# Patient Record
Sex: MALE | Race: BLACK OR AFRICAN AMERICAN | Employment: UNEMPLOYED | URBAN - METROPOLITAN AREA
[De-identification: names, ages, dates, MRNs, and addresses within clinical notes are randomized per-mention and may not be internally consistent; named-entity substitution may affect disease eponyms.]

---

## 2021-06-29 ENCOUNTER — HOSPITAL ENCOUNTER (OUTPATIENT)
Age: 71
Setting detail: OUTPATIENT SURGERY
Discharge: HOME | End: 2021-06-29
Attending: SPECIALIST | Admitting: SPECIALIST
Payer: MEDICARE

## 2021-06-29 VITALS
OXYGEN SATURATION: 99 % | HEIGHT: 67 IN | DIASTOLIC BLOOD PRESSURE: 43 MMHG | RESPIRATION RATE: 17 BRPM | SYSTOLIC BLOOD PRESSURE: 139 MMHG | BODY MASS INDEX: 26.19 KG/M2 | HEART RATE: 48 BPM | TEMPERATURE: 98.5 F | WEIGHT: 166.9 LBS

## 2021-06-29 DIAGNOSIS — R94.39 ABNORMAL STRESS TEST: ICD-10-CM

## 2021-06-29 LAB
ACT BLD: 180 SECS (ref 79–138)
ACT BLD: 202 SECS (ref 79–138)
ANION GAP SERPL CALC-SCNC: 8 MMOL/L (ref 5–15)
BUN SERPL-MCNC: 20 MG/DL (ref 6–20)
BUN/CREAT SERPL: 20 (ref 12–20)
CALCIUM SERPL-MCNC: 9.1 MG/DL (ref 8.5–10.1)
CHLORIDE SERPL-SCNC: 102 MMOL/L (ref 97–108)
CO2 SERPL-SCNC: 22 MMOL/L (ref 21–32)
CREAT SERPL-MCNC: 1.01 MG/DL (ref 0.7–1.3)
GLUCOSE SERPL-MCNC: 120 MG/DL (ref 65–100)
POTASSIUM SERPL-SCNC: 3.9 MMOL/L (ref 3.5–5.1)
SODIUM SERPL-SCNC: 132 MMOL/L (ref 136–145)

## 2021-06-29 PROCEDURE — 93571 IV DOP VEL&/PRESS C FLO 1ST: CPT | Performed by: SPECIALIST

## 2021-06-29 PROCEDURE — C1887 CATHETER, GUIDING: HCPCS | Performed by: SPECIALIST

## 2021-06-29 PROCEDURE — C1894 INTRO/SHEATH, NON-LASER: HCPCS | Performed by: SPECIALIST

## 2021-06-29 PROCEDURE — 77030004532 HC CATH ANGI DX IMP BSC -A: Performed by: SPECIALIST

## 2021-06-29 PROCEDURE — 77030029065 HC DRSG HEMO QCLOT ZMED -B

## 2021-06-29 PROCEDURE — 74011250636 HC RX REV CODE- 250/636: Performed by: SPECIALIST

## 2021-06-29 PROCEDURE — 77030013715 HC INFL SYS MRTM -B: Performed by: SPECIALIST

## 2021-06-29 PROCEDURE — 99152 MOD SED SAME PHYS/QHP 5/>YRS: CPT | Performed by: SPECIALIST

## 2021-06-29 PROCEDURE — 74011000636 HC RX REV CODE- 636: Performed by: SPECIALIST

## 2021-06-29 PROCEDURE — C1874 STENT, COATED/COV W/DEL SYS: HCPCS | Performed by: SPECIALIST

## 2021-06-29 PROCEDURE — 36415 COLL VENOUS BLD VENIPUNCTURE: CPT

## 2021-06-29 PROCEDURE — 77030008543 HC TBNG MON PRSS MRTM -A: Performed by: SPECIALIST

## 2021-06-29 PROCEDURE — 77030003390 HC NDL ANGI MRTM -A: Performed by: SPECIALIST

## 2021-06-29 PROCEDURE — 74011000250 HC RX REV CODE- 250: Performed by: SPECIALIST

## 2021-06-29 PROCEDURE — 99153 MOD SED SAME PHYS/QHP EA: CPT | Performed by: SPECIALIST

## 2021-06-29 PROCEDURE — 80048 BASIC METABOLIC PNL TOTAL CA: CPT

## 2021-06-29 PROCEDURE — 93005 ELECTROCARDIOGRAM TRACING: CPT

## 2021-06-29 PROCEDURE — 85347 COAGULATION TIME ACTIVATED: CPT

## 2021-06-29 PROCEDURE — 92928 PRQ TCAT PLMT NTRAC ST 1 LES: CPT | Performed by: SPECIALIST

## 2021-06-29 PROCEDURE — 93458 L HRT ARTERY/VENTRICLE ANGIO: CPT | Performed by: SPECIALIST

## 2021-06-29 PROCEDURE — 77030013797 HC KT TRNSDUC PRSSR EDWD -A: Performed by: SPECIALIST

## 2021-06-29 PROCEDURE — 74011250637 HC RX REV CODE- 250/637: Performed by: SPECIALIST

## 2021-06-29 PROCEDURE — C1769 GUIDE WIRE: HCPCS | Performed by: SPECIALIST

## 2021-06-29 DEVICE — STENT RONYX27512UX RESOLUTE ONYX 2.75X12
Type: IMPLANTABLE DEVICE | Status: FUNCTIONAL
Brand: RESOLUTE ONYX™

## 2021-06-29 RX ORDER — FENOFIBRATE 160 MG/1
160 TABLET ORAL DAILY
COMMUNITY

## 2021-06-29 RX ORDER — SODIUM CHLORIDE 0.9 % (FLUSH) 0.9 %
5-40 SYRINGE (ML) INJECTION AS NEEDED
Status: DISCONTINUED | OUTPATIENT
Start: 2021-06-29 | End: 2021-06-29 | Stop reason: HOSPADM

## 2021-06-29 RX ORDER — CLOPIDOGREL BISULFATE 75 MG/1
75 TABLET ORAL DAILY
Qty: 30 TABLET | Refills: 5 | Status: SHIPPED | OUTPATIENT
Start: 2021-06-29

## 2021-06-29 RX ORDER — SODIUM CHLORIDE 9 MG/ML
75 INJECTION, SOLUTION INTRAVENOUS CONTINUOUS
Status: DISCONTINUED | OUTPATIENT
Start: 2021-06-29 | End: 2021-06-29

## 2021-06-29 RX ORDER — ASPIRIN 81 MG/1
81 TABLET ORAL DAILY
COMMUNITY

## 2021-06-29 RX ORDER — SODIUM CHLORIDE 9 MG/ML
125 INJECTION, SOLUTION INTRAVENOUS CONTINUOUS
Status: DISCONTINUED | OUTPATIENT
Start: 2021-06-29 | End: 2021-06-29 | Stop reason: HOSPADM

## 2021-06-29 RX ORDER — FENTANYL CITRATE 50 UG/ML
INJECTION, SOLUTION INTRAMUSCULAR; INTRAVENOUS AS NEEDED
Status: DISCONTINUED | OUTPATIENT
Start: 2021-06-29 | End: 2021-06-29 | Stop reason: HOSPADM

## 2021-06-29 RX ORDER — ATORVASTATIN CALCIUM 40 MG/1
40 TABLET, FILM COATED ORAL
COMMUNITY

## 2021-06-29 RX ORDER — SODIUM CHLORIDE 0.9 % (FLUSH) 0.9 %
5-40 SYRINGE (ML) INJECTION EVERY 8 HOURS
Status: DISCONTINUED | OUTPATIENT
Start: 2021-06-29 | End: 2021-06-29 | Stop reason: HOSPADM

## 2021-06-29 RX ORDER — METFORMIN HYDROCHLORIDE 500 MG/1
500 TABLET ORAL 2 TIMES DAILY WITH MEALS
COMMUNITY

## 2021-06-29 RX ORDER — HYDRALAZINE HYDROCHLORIDE 50 MG/1
50 TABLET, FILM COATED ORAL 3 TIMES DAILY
COMMUNITY

## 2021-06-29 RX ORDER — HYDROCORTISONE SODIUM SUCCINATE 100 MG/2ML
100 INJECTION, POWDER, FOR SOLUTION INTRAMUSCULAR; INTRAVENOUS
Status: DISCONTINUED | OUTPATIENT
Start: 2021-06-29 | End: 2021-06-29 | Stop reason: HOSPADM

## 2021-06-29 RX ORDER — ASPIRIN 325 MG
325 TABLET ORAL DAILY
Status: DISCONTINUED | OUTPATIENT
Start: 2021-06-30 | End: 2021-06-29 | Stop reason: HOSPADM

## 2021-06-29 RX ORDER — MIDAZOLAM HYDROCHLORIDE 1 MG/ML
INJECTION, SOLUTION INTRAMUSCULAR; INTRAVENOUS AS NEEDED
Status: DISCONTINUED | OUTPATIENT
Start: 2021-06-29 | End: 2021-06-29 | Stop reason: HOSPADM

## 2021-06-29 RX ORDER — HEPARIN SODIUM 1000 [USP'U]/ML
INJECTION, SOLUTION INTRAVENOUS; SUBCUTANEOUS AS NEEDED
Status: DISCONTINUED | OUTPATIENT
Start: 2021-06-29 | End: 2021-06-29 | Stop reason: HOSPADM

## 2021-06-29 RX ORDER — POTASSIUM CHLORIDE 750 MG/1
TABLET, FILM COATED, EXTENDED RELEASE ORAL DAILY
COMMUNITY

## 2021-06-29 RX ORDER — AMLODIPINE BESYLATE 5 MG/1
5 TABLET ORAL DAILY
COMMUNITY
End: 2021-11-12

## 2021-06-29 RX ORDER — NITROGLYCERIN 0.4 MG/1
0.4 TABLET SUBLINGUAL
Status: DISCONTINUED | OUTPATIENT
Start: 2021-06-29 | End: 2021-06-29 | Stop reason: HOSPADM

## 2021-06-29 RX ORDER — CLOPIDOGREL 300 MG/1
TABLET, FILM COATED ORAL AS NEEDED
Status: DISCONTINUED | OUTPATIENT
Start: 2021-06-29 | End: 2021-06-29 | Stop reason: HOSPADM

## 2021-06-29 RX ORDER — LIDOCAINE HYDROCHLORIDE 10 MG/ML
INJECTION INFILTRATION; PERINEURAL AS NEEDED
Status: DISCONTINUED | OUTPATIENT
Start: 2021-06-29 | End: 2021-06-29 | Stop reason: HOSPADM

## 2021-06-29 NOTE — Clinical Note
TRANSFER - OUT REPORT:     Verbal report given to: kady. Report consisted of patient's Situation, Background, Assessment and   Recommendations(SBAR). Opportunity for questions and clarification was provided. Patient transported with a Registered Nurse. [place] [facility] [place]

## 2021-06-29 NOTE — ROUTINE PROCESS
11:07 AM    Patient arrived. ID and allergies verified verbally with patient. Pt voices understanding of procedure to be performed. Consent obtained. Pt prepped for procedure. 3:02 PM    PCI patient meets criteria for outpatient cardiac rehab. [facility] outpatient cardiac rehab referral will be initiated. Educational handouts provided on: PCI procedure, coronary artery disease, coronary artery risk factors, heart healthy eating, and community resources. Reference information on [address] Outpatient Cardiac Rehab Program also provided.  [facility] cardiac rehab staff will contact patent, per telephone call, to assess and schedule program participation

## 2021-06-29 NOTE — PROGRESS NOTES
17:30 I have reviewed discharge instructions with the patient and patients daughter. The patient and his daughter verbalized understanding. All questions answered.

## 2021-06-29 NOTE — H&P
Date of Surgery Update:  Brayden Busch was seen and examined. History and physical has been reviewed. The patient has been examined. There have been no significant clinical changes since the completion of the originally dated History and Physical.    Signed By: Stephen Carrizales MD     June 29, 2021 12:11 PM       Hannah Ordaz 1950   Office/Outpatient Visit  Visit Date: Tue, Jun 22, 2021 1:23 pm  Provider: Jes Ferro MD (Assistant: Sherryle Finland, LPN )  Location: [facility]iology of [facility])[address] [place]. [address] [phone]    Electronically signed by Angel Clifton MD on  06/22/2021 02:17:02 PM                           Subjective:    CC: Mr. Seema Gaming is a 80year old [de-identified] or  male. His primary care physician is Jaden Fox MD.  This is a follow-up visit. Since his last visit, he has had the following testing: stress echocardiogram (6/22/21). He was recently in the hospital: on a recent  ER visit on 05/27/2021 at [facility]. Patient did not bring medication list or bottles for review. He has a history of hypercholesterolemia,  essential hypertension, and mild severity mitral regurgitation. HPI:           Hypercholesterolemia: Current treatment includes Lipitor and Tricor. Regarding hypertension:  MD Notes: K was 3.3 and replaced in the ER. Type Primary Hypertension Review of his blood pressure log reveals systolics in the 471-762B and diastolics in the 75-46H. Current symptoms include chest pain, palpitations, dizziness and numbness in fingertips. He denies shortness of breath or lower extremity edema. Currently, his treatment regimen consists of an angiotensin receptor blocker ( valsartan ),  Norvasc, a diuretic ( chlorthalidone ), and a vasodilator ( hydralazine ). Heart Valves:  Mitral Regurgitation: Mitral regurgitation is mild.   To evaluate for mitral regurgitation, the patient has had a prior echocardiogram ( performed 6/16/2020; official interpretation shows 6/16/2020 - Normal LV systolic function with an estimated ejection fraction of 60%. There is moderate left ventricular hypertrophy. There is trace aortic regurgitation. There is trace mitral regurgitation. There is trace tricuspid regurgitation. There is trace pulmonary regurgitation. Mild aortic stenosis with a calculated aortic valve area of 1.7 cm2 with a peak gradient of 25 mmHg and a mean gradient of 11 mmHg. ). Reviewed Hospital Records           Heart Valves: Aortic Stenosis: Aortic stenosis is mild. Paplitations/Arrhythmias:  Regarding cardiac arrhythmia (unspecified): He describes the sensation as strong beat and rapid heart beat. There are no identifiable aggravating factors. There are no associated symptoms; he specifically denies dizziness and syncope. MD Notes: MD Notes Atrioventricular block, second degree noted. Prior work-up has included holter monitoring ( results: Holter 6/16/21 - Baseline rhythm was normal sinus. Intermittent second degree AV block (type 1) and 2:1 AV block. Short run of non-sustained ventricular tachycardia vs. accelerated junctional rhythm. Average heart rate was 71 bpm.  Minimum heart rate of 33 bpm and maximum heart rate of 98 bpm.  There were rare PACs. There were rare PVCs. ). Coronary Artery Disease: Current symptoms include angina and chest pain. He denies lower extremity edema or shortness of breath. MD Notes: Stress echo showed LVH, mild aortic stenosis, mild MR and TR. Patient developed chest pain during the study with abnormal ECG. Abnormal result of other cardiovascular function study noted. MD Notes: Carotid 6/22/21 - Moderate plaques involving the carotid system. 50 - 79% stenosis of the right internal carotid artery. 50 - 79% stenosis of the left internal carotid artery. Antegrade vertebral artery flow bilaterally.    Right external carotid stenosis. Occlusion and stenosis of bilateral carotid arteries noted. Past Medical History / Family History / Social History:     Last Reviewed on 6/11/2021 11:45 AM by Herb Smart Settler  Past Medical History:     Hypercholesterolemia  Hypertension  Mitral Regurgitation: mild severity;   hemorrhoids   Type 2 Diabetes   INFLUENZA VACCINE: declined   COVID-19 VACCINE: Dago Montanez     Past Cardiac Procedures/Tests:  Echocardiogram on 9/6/18 - Normal LV systolic function with an estimated ejection fraction of 60%. There is mild left ventricular hypertrophy. The left atrium is mildly enlarged. There is mild mitral regurgitation. There is mild tricuspid regurgitation. Mild aortic stenosis with a calculated aortic valve area of 1.5 cm2 with a peak gradient of 17 mmHg and a mean gradient of 10 mmHg. [facility] Echocardiogram on 6/16/2020 - Normal LV systolic function with an estimated ejection fraction of 60%. There is moderate left ventricular hypertrophy. There is trace aortic regurgitation. There is trace mitral regurgitation. There is trace tricuspid regurgitation. There is trace pulmonary regurgitation. Mild aortic stenosis with a calculated aortic valve area of 1.7 cm2 with a peak gradient of 25 mmHg and a mean gradient of 11 mmHg. .  Carotid Study:  9/6/18 - Mild plaques involving the carotid system. <50% stenosis of the right internal carotid artery. <50% stenosis of the left internal carotid artery. Antegrade vertebral artery flow bilaterally. Right external carotid stenosis. Stress Echo:  12/15/2011, There was no ECG evidence of ischemia. No ischemia noted on echocardiogram.  The patient did not experience chest discomfort. Normal heart rate and blood pressure response to exercise. Good exercise capacity. The aortic valve is thickened There is trace mitral regurgitation. There is trace pulmonary regurgitation. Stress Echo 5/24*16 8 mins. The ECG was non-diagnostic due to baseline ST-T changes. No ischemia noted on echocardiogram, normal pre & post global wall motion. The patient did not experience chest discomfort. Normal heart rate response to exercise. Hypertensive response to exercise. Good exercise capacity. Rosa score is 3. Stress echo 4/4/08 - Exercise stress test negative by objective and subjective criteria. Good exercise capacity. Frequent PVC, couplets, triplets and NSVT (4 beats) in study. Normal stress echo. Surgical History:   Surgical/Procedural History:   cyst removed on right leg     Family History:   Family History:   Father: Cause of death was mosquito bite in [place], age 66. Mother: Cause of death was cause unknown, age 67. Social History:   Social History:   Occupation:  and works for [employer];   Marital Status:    Children: 2 children     Tobacco/Alcohol/Supplements:   Last Reviewed on 6/11/2021 11:45 AM by Collin Stapler, Trellis Sever  TOBACCO/ALCOHOL/SUPPLEMENTS   Tobacco: He has a past history of cigarette smoking; quit 1999. Alcohol: Drinks alcohol on a regular basis. He typically consumes red wine. Substance Abuse History:   Last Reviewed on 6/11/2021 11:45 AM by Smita Mendoza  Substance Use/Abuse:   None     Mental Health History:   Last Reviewed on 6/11/2021 11:45 AM by Smita Mendoza    Communicable Diseases (eg STDs):    Last Reviewed on 6/11/2021 11:45 AM by Collin Stapler, Trellis Sever    Current Problems:   Last Reviewed on 6/11/2021 11:45 AM by Collin Stapler, Trellis Sever  Pure hypercholesterolemia  Hypercholesterolemia  Abnormal ECG  HTN  Cardiac Murmur  Essential hypertension, benign  Essential (primary) hypertension  Cardiac murmur  Nonrheumatic mitral (valve) insufficiency  Nonrheumatic aortic (valve) stenosis  Chest pain, unspecified  Mitral valve regurgitation  Other nonrheumatic mitral valve disorders  Atrioventricular block, second degree  Cardiac arrhythmia, unspecified  Palpitations    Allergies:   Last Reviewed on 6/11/2021 11:45 AM by Santiago Conley Erick  No Known Allergies. Current Medications:   Last Reviewed on 6/11/2021 11:45 AM by Erna Mo  [10meq 1 po qd]  Humalog Kwik pen as directed   Humalog Kwik pen mix as directed   Fenofibrate 160 mg oral tablet [1 po qd per PCP]  atorvastatin 40 mg oral tablet [1 po qd per PCP]  metFORMIN 500 mg oral tablet [1 po bid]  chlorthalidone 25 mg oral tablet [take 1 tablet (25 mg) by oral route once daily]  amlodipine-valsartan  mg oral tablet [take 1 tablet by oral route once daily]  hydrALAZINE 50 mg oral tablet [take 1 tablet (50 mg) by oral route 2 times per day with food]    Objective:    Vitals:     Current: 6/22/2021 1:51:08 PM  Ht:  5 ft, 7 in; Wt: 173 lbs;  BMI: 27. 1BP: 102/52 mm Hg (left arm, sitting);  P: 81 bpm;  sCr: 1.22 mg/dL;  GFR: 52.25    Repeat:   1:51:19 PM  BP:   105/51mm Hg (left arm)   Exams:   GENERAL:  Alert, oriented to person, place and time. HEENT:  Pinkish palpebral  conjunctivae. Anicteric sclerae. Neck: Bilateral carotid bruit   CHEST: Equal expansion. Clear breath sounds. No rales, no wheezing. Heart: Reg rate and rhythm. Grade 3/6 systolic ejection murmur at the aortic area radiating to the neck radiating to the precordium. Occasional skipped beats. ABDOMEN:  Soft. Normal active bowel sounds. No tenderness. EXTREMITIES:  No pitting pedal edema. Equal pulses bilaterally. NEURO:  Grossly intact.       Lab/Test Results:     Sodium, Serum: 146 (04/01/2020), 141 (05/18/2021),   Potassium, Serum: 4.1 (04/01/2020), 3.9 (05/18/2021),   Glucose Serum: 79 (04/01/2020), 80 (05/18/2021),   BUN serum/plasma (sCnc): 11 (04/01/2020), 23 (05/18/2021),   Creatinine, Serum: 0.95 (04/01/2020), 1.22 (05/18/2021),   HgbA1c: 7.5 (04/01/2020), 7.2 (05/18/2021),   Total Cholesterol: 171 (04/01/2020), 167 (05/18/2021),   Triglycerides: 191 (04/01/2020), 133 (05/18/2021),   HDL Target >55: 48 (05/18/2021),   LDL Target <110: 95 (05/18/2021),   AST (SGOT): 23 (04/01/2020), 22 (05/18/2021),   ALT (SGPT): 33 (04/01/2020), 30 (05/18/2021),   WBC count: 6.3 (04/01/2020), 7.8 (05/18/2021),   RBC count: 4.49 (04/01/2020), 4.23 (05/18/2021),   Hgb: 13.1 (04/01/2020), 12.6 (05/18/2021),   Hct: 38.7 (04/01/2020), 36.9 (05/18/2021),   MCV: 86 (04/01/2020), 87 (05/18/2021),   Platelets: 872 (41/65/7292), 307 (05/18/2021),       Procedures:   Pure hypercholesterolemia    ECG INTERPRETATION: See scanned EKG for results.         Assessment:     E78.0   Pure hypercholesterolemia     I10   Essential (primary) hypertension     I34.0   Nonrheumatic mitral (valve) insufficiency     I35.0   Nonrheumatic aortic (valve) stenosis     I10   Essential (primary) hypertension     I34.0   Nonrheumatic mitral (valve) insufficiency     R00.2   Palpitations     R00.2   Palpitations     I49.9   Cardiac arrhythmia, unspecified     I44.1   Atrioventricular block, second degree     I25.118   Atherosclerotic heart disease of native coronary artery with other forms of angina pectoris     E78.00   Pure hypercholesterolemia     I25.1   Atherosclerotic heart disease of native coronary artery     I25.11   Atherosclerotic heart disease of native coronary artery with angina pectoris     R07.89   Other chest pain     R94.39   Abnormal result of other cardiovascular function study     I65.23   Occlusion and stenosis of bilateral carotid arteries       ORDERS:     Procedures Ordered:     [id number]  Electrocardiogram, routine with at least 12 leads; with interpretation and report  (In-House)          63721  Education and train for pt self-mgmt by qualified, nonphysician, ea 30 minutes; individual pt  (Send-Out)          XCATH  Cardiac Cath  (In-House)          Other Orders:     [id number]  Queried Patient for Tobacco Use  (Send-Out)              Plan:     Pure hypercholesterolemia      Orders:     [id number]  Electrocardiogram, routine with at least 12 leads; with interpretation and report  (In-House)          Atherosclerotic heart disease of native coronary artery with other forms of angina pectoris  1. Medication list has been reviewed. Change the following medication(s):  Hydralazine 50 mg. take 1/2 tablet (25 mg.) twice daily. Start the following medication(s): Baby Aspirin 81 mg. take one tablet once daily with food. Smoking Status:  Nonsmoker   2. Advised the patient regarding diet, exercise, and lifestyle modification. 3.  The patient to call the office if there is any change in his cardiac symptoms. 4.  Explained to the patient the importance of controlling his cardiac risk factors. Will address the AV Block on his next visit. Testing/Procedures: Cardiac Catheterization  Explained to the patient the indication, procedure, risks, and benefits of cardiac catheterization. The patient understands  and wishes to proceed with the cath to be performed as an outpatient at [facility] by Dr. Sherren Atkinson. Schedule a follow up appointment in 2 weeks. Also recommended that patient limit physical activity at this time. I also recommend that you monitor your BP. Target BP less than 130/80. The above note was transcribed by Gerri Coffey and authenticated by Dr. Priscilla Burton prior to sign off.

## 2021-06-29 NOTE — PROGRESS NOTES
3:25 PM  Blood aspirated and 6Fr arterial sheath pulled from R groin. Yasir Horner applied. Manual pressure held by Marycarmen Barron RN for 10 minutes. 5:40 PM  Patient ambulated to the restroom, no dizziness or lightheadedness. R groin site CDI and soft to the touch.    5:55 PM  Pt discharged via wheelchair with family. Personal belongings with patient upon discharge.

## 2021-06-29 NOTE — DISCHARGE INSTRUCTIONS
Discharge Instructions:                    Cardiac Catheterization/Angiography Discharge Instructions    *Check the puncture site frequently for swelling or bleeding. If you see any bleeding, lie down and apply pressure over the area with a clean town or washcloth. Notify your doctor for any redness, swelling, drainage or oozing from the puncture site. Notify your doctor for any fever or chills. *If the leg with the puncture becomes cold, numb or painful, call Dr John Dubois at  [phone]. *Activity should be limited for the next 48 hours. Climb stairs as little as possible and avoid any stooping, bending or strenuous activity for 48 hours. No heavy lifting (anything over 10 pounds) for five days. *Do not drive for 48 hours. *You may resume your usual diet. Drink more fluids than usual.    *Have a responsible person drive you home and stay with you for at least 24 hours after your heart catheterization/angiography. *You may remove the bandage from your Right Groin in 24 hours. You may shower in 24 hours. No tub baths, hot tubs or swimming for one week. Do not place any lotions, creams, powders, ointments over the puncture site for one week. You may place a clean band-aid over the puncture site each day for 5 days. Change this daily. Medications:     Do NOT restart metformin (glucophage) until Thursday, July 1st, 2021. Activity:     As tolerated except do not lift over 10 pounds for 5 days. Diet:     American Heart Association. Follow-up:     Follow up with Natalya Torrez MD on July 6th, 2021 at 3:20pm.  [address] [place] [address]  [phone]      If you smoke, STOP!

## 2021-06-29 NOTE — PROCEDURES
Cath:  Obstructive 1VD     LAD m70 (iFR = 0.71, significant), intramuscular mid-distal portion; D3 ost80 (small)     LCx m40     RCA LIs  Normal LVF (EF 70%).   20 mmHg AVG, no MR  Successful TARAN mLAD: 2.75x12 Resolute  RFA manual    ASA/plavix, statin  F/U with Dr. Marveen Gaucher 7/6/21 @ 3:20pm.

## 2021-06-30 LAB
ATRIAL RATE: 72 BPM
CALCULATED P AXIS, ECG09: 37 DEGREES
CALCULATED R AXIS, ECG10: 90 DEGREES
CALCULATED T AXIS, ECG11: -7 DEGREES
DIAGNOSIS, 93000: NORMAL
P-R INTERVAL, ECG05: 242 MS
Q-T INTERVAL, ECG07: 462 MS
QRS DURATION, ECG06: 146 MS
QTC CALCULATION (BEZET), ECG08: 505 MS
VENTRICULAR RATE, ECG03: 72 BPM

## 2021-07-09 ENCOUNTER — TRANSCRIBE ORDER (OUTPATIENT)
Dept: CARDIAC REHAB | Age: 71
End: 2021-07-09

## 2021-07-09 DIAGNOSIS — Z95.5 STENTED CORONARY ARTERY: Primary | ICD-10-CM

## 2021-07-13 NOTE — PROGRESS NOTES
HISTORY OF PRESENTING ILLNESS      Angel Warren is a 80 y.o. male with HTN, hypercholesteremia, mitral valve insufficiency, aortic valve stenosis referred for palpitations and shortness of breath with second degree heart block noted on previous holter monitoring (2021). He underwent PCI to the LAD on 2021. Monitor prior demonstrated episodes of bradycardia. He has experienced dizziness, palpitations and paresthesias in his fingers/feet as well. He is currently wearing a monitor and has continued to experience symptoms. Monitor showed episodes of Wenckebach and possible 2:1 AV conduction. He reported palpitations during he was sinus rhythm in the 90s. PAST MEDICAL HISTORY     Palpitations  Shortness of breath  Bradycardia  CAD, native  Percutaneous transluminal angioplasty       PAST SURGICAL HISTORY     History reviewed. No pertinent surgical history. ALLERGIES     No Known Allergies       FAMILY HISTORY     History reviewed. No pertinent family history. negative for cardiac disease       SOCIAL HISTORY     Social History     Socioeconomic History    Marital status:      Spouse name: Not on file    Number of children: Not on file    Years of education: Not on file    Highest education level: Not on file   Tobacco Use    Smoking status: Former Smoker     Packs/day: 1.00     Years: 10.00     Pack years: 10.00     Quit date: 1999     Years since quittin.0    Smokeless tobacco: Never Used   Substance and Sexual Activity    Alcohol use: Not Currently     Social Determinants of Health     Financial Resource Strain:     Difficulty of Paying Living Expenses:    Food Insecurity:     Worried About Running Out of Food in the Last Year:     [address] in the Last Year:    Transportation Needs:     Lack of Transportation (Medical):      Lack of Transportation (Non-Medical):    Physical Activity:     Days of Exercise per Week:     Minutes of Exercise per Session:    Stress:     Feeling of Stress :    Social Connections:     Frequency of Communication with Friends and Family:     Frequency of Social Gatherings with Friends and Family:     Attends [employer] Services:     Active Member of Clubs or Organizations:     Attends Club or Organization Meetings:     Marital Status:          MEDICATIONS     Current Outpatient Medications   Medication Sig    amLODIPine (NORVASC) 5 mg tablet Take 5 mg by mouth daily.  aspirin delayed-release 81 mg tablet Take 81 mg by mouth daily.  atorvastatin (LIPITOR) 40 mg tablet Take 40 mg by mouth daily.  fenofibrate (LOFIBRA) 160 mg tablet Take 160 mg by mouth daily.  hydrALAZINE (APRESOLINE) 50 mg tablet Take 50 mg by mouth three (3) times daily.  metFORMIN (GLUCOPHAGE) 500 mg tablet Take 500 mg by mouth two (2) times daily (with meals).  potassium chloride SR (KLOR-CON 10) 10 mEq tablet Take  by mouth.  clopidogreL (Plavix) 75 mg tab Take 1 Tablet by mouth daily. Indications: blood clot prevention following percutaneous coronary intervention     No current facility-administered medications for this visit. I have reviewed the nurses notes, vitals, problem list, allergy list, medical history, family, social history and medications. REVIEW OF SYMPTOMS      General: Pt denies excessive weight gain or loss. Pt is able to conduct ADL's  HEENT: Denies blurred vision, headaches, hearing loss, epistaxis and difficulty swallowing. Respiratory: Denies cough, congestion, shortness of breath, HAIRSTON, wheezing or stridor.   Cardiovascular: Denies precordial pain, palpitations, edema or PND  Gastrointestinal: Denies poor appetite, indigestion, abdominal pain or blood in stool  Genitourinary: Denies hematuria, dysuria, increased urinary frequency  Musculoskeletal: Denies joint pain or swelling from muscles or joints  Neurologic: Denies tremor, paresthesias, headache, or sensory motor disturbance  Psychiatric: Denies confusion, insomnia, depression  Integumentray: Denies rash, itching or ulcers. Hematologic: Denies easy bruising, bleeding       PHYSICAL EXAMINATION      Vitals: see vitals section  General: Well developed, in no acute distress. HEENT: No jaundice, oral mucosa moist, no oral ulcers  Neck: Supple, no stiffness, no lymphadenopathy, supple  Heart:  Normal S1/S2 negative S3 or S4. Regular, no murmur, gallop or rub, no jugular venous distention  Respiratory: Clear bilaterally x 4, no wheezing or rales  Abdomen:   Soft, non-tender, bowel sounds are active. Extremities:  No edema, normal cap refill, no cyanosis. Musculoskeletal: No clubbing, no deformities  Neuro: A&Ox3, speech clear, gait stable, cooperative, no focal neurologic deficits  Skin: Skin color is normal. No rashes or lesions. Non diaphoretic, moist.  Vascular: 2+ pulses symmetric in all extremities       DIAGNOSTIC DATA      EKG:   Visit Vitals  BP (!) 144/60 (BP 1 Location: Left upper arm, BP Patient Position: Sitting)   Pulse 82   Resp 16   Ht 5' 7\" (1.702 m)   Wt 167 lb (75.8 kg)   SpO2 98%   BMI 26.16 kg/m²        LABORATORY DATA    No results found for: WBC, HGBPOC, HGB, HGBP, HCTPOC, HCT, PHCT, RBCH, PLT, MCV, HGBEXT, HCTEXT, PLTEXT, HGBEXT, HCTEXT, PLTEXT   Lab Results   Component Value Date/Time    Sodium 132 (L) 06/29/2021 11:31 AM    Potassium 3.9 06/29/2021 11:31 AM    Chloride 102 06/29/2021 11:31 AM    CO2 22 06/29/2021 11:31 AM    Anion gap 8 06/29/2021 11:31 AM    Glucose 120 (H) 06/29/2021 11:31 AM    BUN 20 06/29/2021 11:31 AM    Creatinine 1.01 06/29/2021 11:31 AM    BUN/Creatinine ratio 20 06/29/2021 11:31 AM    GFR est AA >60 06/29/2021 11:31 AM    GFR est non-AA >60 06/29/2021 11:31 AM    Calcium 9.1 06/29/2021 11:31 AM           ASSESSMENT      1. Bradycardia  2. CAD, native  3. Percutaneous transluminal   4. Dyslipidemia  5. Diabetes mellitus  6. Fatigue  7.  Dizziness         PLAN     Continue monitoring for now as it is unclear whether symptoms are cardiac and therefore whether PPM therapy would be beneficial (to allow addition of drug therapy). Will also refer to Dr. Angella Hooks for dizziness          ICD-10-CM ICD-9-CM    1. Dizziness  R42 780.4 AMB POC EKG ROUTINE W/ 12 LEADS, INTER & REP   2. AVB (atrioventricular block)  I44.30 426.10 AMB POC EKG ROUTINE W/ 12 LEADS, INTER & REP     Orders Placed This Encounter    AMB POC EKG ROUTINE W/ 12 LEADS, INTER & REP     Order Specific Question:   Reason for Exam:     Answer:   dizziness          FOLLOW-UP     After monitor      Thank you, Jammie Camilo MD and Dr. Shea Maza for allowing me to participate in the care of this extraordinarily pleasant male. Please do not hesitate to contact me for further questions/concerns.          Aleta Nelson MD  Cardiac Electrophysiology / Cardiology    [place] [address].  [address] [place] [address]  [phone] / [phone] Fax   [phone] / [phone] Fax

## 2021-07-14 ENCOUNTER — OFFICE VISIT (OUTPATIENT)
Dept: CARDIOLOGY CLINIC | Age: 71
End: 2021-07-14
Payer: MEDICARE

## 2021-07-14 VITALS
HEART RATE: 82 BPM | OXYGEN SATURATION: 98 % | WEIGHT: 167 LBS | HEIGHT: 67 IN | DIASTOLIC BLOOD PRESSURE: 60 MMHG | SYSTOLIC BLOOD PRESSURE: 144 MMHG | BODY MASS INDEX: 26.21 KG/M2 | RESPIRATION RATE: 16 BRPM

## 2021-07-14 DIAGNOSIS — I44.30 AVB (ATRIOVENTRICULAR BLOCK): ICD-10-CM

## 2021-07-14 DIAGNOSIS — R42 DIZZINESS: Primary | ICD-10-CM

## 2021-07-14 PROCEDURE — 1101F PT FALLS ASSESS-DOCD LE1/YR: CPT | Performed by: INTERNAL MEDICINE

## 2021-07-14 PROCEDURE — 3017F COLORECTAL CA SCREEN DOC REV: CPT | Performed by: INTERNAL MEDICINE

## 2021-07-14 PROCEDURE — 99205 OFFICE O/P NEW HI 60 MIN: CPT | Performed by: INTERNAL MEDICINE

## 2021-07-14 PROCEDURE — G8510 SCR DEP NEG, NO PLAN REQD: HCPCS | Performed by: INTERNAL MEDICINE

## 2021-07-14 PROCEDURE — G0463 HOSPITAL OUTPT CLINIC VISIT: HCPCS | Performed by: INTERNAL MEDICINE

## 2021-07-14 PROCEDURE — G8419 CALC BMI OUT NRM PARAM NOF/U: HCPCS | Performed by: INTERNAL MEDICINE

## 2021-07-14 PROCEDURE — 93010 ELECTROCARDIOGRAM REPORT: CPT | Performed by: INTERNAL MEDICINE

## 2021-07-14 PROCEDURE — G8536 NO DOC ELDER MAL SCRN: HCPCS | Performed by: INTERNAL MEDICINE

## 2021-07-14 PROCEDURE — G8427 DOCREV CUR MEDS BY ELIG CLIN: HCPCS | Performed by: INTERNAL MEDICINE

## 2021-07-14 PROCEDURE — 93005 ELECTROCARDIOGRAM TRACING: CPT | Performed by: INTERNAL MEDICINE

## 2021-07-14 NOTE — PROGRESS NOTES
Room EP 5  Visit Vitals  BP (!) 144/60 (BP 1 Location: Left upper arm, BP Patient Position: Sitting)   Pulse 82   Resp 16   Ht 5' 7\" (1.702 m)   Wt 167 lb (75.8 kg)   SpO2 98%   BMI 26.16 kg/m²     Wearing 30 day monitor from 's office  Dry cough when lying down  Chest pain: yes, \"tiredness\" with exertion  Shortness of breath: yes  Edema: yes  Palpitations, Skipped beats, Rapid heartbeat: yes, feels \"throbbing\" in right leg and foot with episodes,feels heart pounding when lying down  Dizziness:lightheadedness at times  Fatigue:yes     New diagnosis/Surgeries: no    ER/Hospitalizations: [facility] for heart rate low and unsteady gait, low NA    Refills:no

## 2021-07-16 ENCOUNTER — TELEPHONE (OUTPATIENT)
Dept: NEUROLOGY | Age: 71
End: 2021-07-16

## 2021-07-16 NOTE — TELEPHONE ENCOUNTER
No pa required for ans testing [phone]  Patient has medicare as primary    Hold prior to testing  norvasc-10 daays  Hydralazine-2 days

## 2021-07-22 ENCOUNTER — TELEPHONE (OUTPATIENT)
Dept: CARDIAC REHAB | Age: 71
End: 2021-07-22

## 2021-07-22 NOTE — TELEPHONE ENCOUNTER
7/22/2021 Cardiac Rehab: Mr. Nikolas Birmingham RTHUTW'C daughter called back to discuss participation in the Cardiac Rehab Program following STENT on 6/29/2021. Made intake appointment for 8/5/2021 @230pm. Mr. Miller Said and daughter are without questions or concerns. Email paperwork per daughter and patient. Earl Cartagena      7/22/21 3:25 PM  Note     7/22/2021 Cardiac Rehab: Called Mr. Miller Said to discuss participation in the Cardiac Rehab Program following STENT on 6/29/21. Left voicemail message.  Earl Cartagena

## 2021-07-22 NOTE — TELEPHONE ENCOUNTER
7/22/2021 Cardiac Rehab: Called  Tin Crew to discuss participation in the Cardiac Rehab Program following STENT on 6/29/21. Left voicemail message.  Saray Sampson

## 2021-07-23 ENCOUNTER — TELEPHONE (OUTPATIENT)
Dept: NEUROLOGY | Age: 71
End: 2021-07-23

## 2021-07-23 ENCOUNTER — DOCUMENTATION ONLY (OUTPATIENT)
Dept: CARDIOLOGY CLINIC | Age: 71
End: 2021-07-23

## 2021-07-23 NOTE — TELEPHONE ENCOUNTER
----- Message from Garett Shrestha sent at 7/23/2021 12:30 PM EDT -----  Regarding: MD Roach/ telephone  Patient return call    Caller's first and last name and relationship (if not the patient): Rowdy St (daughter)      Best contact number(s): [phone]      Whose call is being returned: Mila Cardenas      Details to clarify the request: Pt's daughter is requesting call to discuss pt's upcoming ans testing on 8/3/21. Pt was advised to stop amLODIPine 5 mg 10 days prior to testing. Pt's cardiologist does not advise pt to stop medicine for 10 days unless medication \"Chlordrine\" is prescribed. Caller is requesting call for clarification.           Garett Shrestha

## 2021-07-23 NOTE — TELEPHONE ENCOUNTER
Called and relayed per md she states she will talk with cardiology   Let her know what ever he can hold just let us know once he comes in what he did   She states understnsaintg

## 2021-07-27 ENCOUNTER — TELEPHONE (OUTPATIENT)
Dept: CARDIAC REHAB | Age: 71
End: 2021-07-27

## 2021-07-27 NOTE — TELEPHONE ENCOUNTER
7/27/2021 Cardiac Rehab: Called Mr. Pj Hawk to remind of intake appointment on Thursday, 7/29/21 @230pm. Confirmed appointment with patient's daughter. Provided patient with contact information for [facility]. Also, reminded patient to bring a list of current medications, a personal schedule, and to wear comfortable clothes and shoes.  Michelle Infante

## 2021-07-29 ENCOUNTER — HOSPITAL ENCOUNTER (OUTPATIENT)
Dept: CARDIAC REHAB | Age: 71
Discharge: HOME | End: 2021-07-29
Payer: MEDICARE

## 2021-07-29 VITALS — WEIGHT: 162 LBS | HEIGHT: 67 IN | BODY MASS INDEX: 25.43 KG/M2

## 2021-07-29 PROCEDURE — 93798 PHYS/QHP OP CAR RHAB W/ECG: CPT

## 2021-07-29 PROCEDURE — 93797 PHYS/QHP OP CAR RHAB WO ECG: CPT

## 2021-07-29 NOTE — CARDIO/PULMONARY
Pj Hawk  80 y.o. presented to cardiac wellness for orientation and exercise tolerance test today with a primary diagnosis of stent on 0629/2021. His EF is 70%. Pj Hawk has a history of hypercholesterolemia, essential hypertension,mild severity mitral regurgitation and aortic stenosis. He had a Holter monitor 6/2021 that showed 2 AVB, 2:1 block, NSVT vs Accelerated Junctional Rhythm, rare PVCs and rare PACs. He is currently wearing a 30 day heart monitor and is being followed by Dr. Shayne Lucero. Cardiac risk factors include dyslipidemia, diabetes mellitus, hypertension, stress, valvular heart disease and these were reviewed with Mr. Marie Leon and his daughter. Pj Hawk lives with his supportive daughter. He is retired . PHQ9, depression score, is 14 and this is considered moderate. The result was discussed with patient who affirms score to be accurate due to concerns about his heart rhythm and periods of slight confusion since the end of June. Mr. Marie Leon has a f/u appointment with Dr. Shayne Lucero on 8/4 regarding his heart rhythm. He also is seeing a neurologist regarding his new episodes of confusion. Patient denied chest pain or SOB during 6 minute walk and was in SR 1AVB RBBB, brief 2AVB. Pj Hawk will attend a 60 minute class once a week and will exercise 3 times weekly in cardiac wellness. An education manual was given to the patient and reviewed briefly.    Emy White RN  7/29/2021

## 2021-08-03 ENCOUNTER — OFFICE VISIT (OUTPATIENT)
Dept: NEUROLOGY | Age: 71
End: 2021-08-03
Payer: MEDICARE

## 2021-08-03 ENCOUNTER — HOSPITAL ENCOUNTER (OUTPATIENT)
Dept: CARDIAC REHAB | Age: 71
Discharge: HOME | End: 2021-08-03
Payer: MEDICARE

## 2021-08-03 VITALS — WEIGHT: 159.8 LBS | BODY MASS INDEX: 25.03 KG/M2

## 2021-08-03 DIAGNOSIS — R42 DIZZINESS AND GIDDINESS: Primary | ICD-10-CM

## 2021-08-03 PROCEDURE — 95923 AUTONOMIC NRV SYST FUNJ TEST: CPT | Performed by: PSYCHIATRY & NEUROLOGY

## 2021-08-03 PROCEDURE — 93798 PHYS/QHP OP CAR RHAB W/ECG: CPT

## 2021-08-03 PROCEDURE — 93797 PHYS/QHP OP CAR RHAB WO ECG: CPT

## 2021-08-03 PROCEDURE — 95924 ANS PARASYMP & SYMP W/TILT: CPT | Performed by: PSYCHIATRY & NEUROLOGY

## 2021-08-03 NOTE — LETTER
8/3/2021 11:53 AM    Patient:  Jonny Card   YOB: 1950  Date of Visit: 8/3/2021      Dear MD Fortino Jamison [address] [place] [address]  Via Fax: [phone]:      Thank you for referring Mr. Jonny Card to me for ANS testing. [facility]  [address] [facility], [address] [place], [address] [facility]  Phone: [phone]  FAX: [phone]     Clinical Autonomic Testing Report     Patient ID:  Jonny Card  [id number]  80 y.o.  1950     REFERRED BY: Claudeen Rast, MD  PCP: Angelica Crenshaw MD    Date of Testin/3/2021     Indication/History:     Patient comes in with bradycardia, dizziness, palpitations and paresthesias in fingers and feet. (+) AV block (+) diabetes    Patient is coming for syncope/autonomic dysfunction evaluation. Medications taken 48 hrs before the test: ASA, Lipitor, Metformin, Plavix     Procedure: This Autonomic Nervous System (ANS) testing is performed by utilizing [address] [place] [address] [employer] Autonomic System, with established protocol. Multiple procedures performed: Heart rate response to deep breathing (HRDB), Valsalva ratio, Heart rate and BP response to head up tilt (HUT), and Quantitative sudomotor axon reflex testing (QSWEAT) . Result:  1. Heart response to deep  breathing (HRDB): 2 series of 6 cycles were performed and the mean of 6 consecutive cycles was obtained. Average HR difference was 10.6 and E:I ratio was 1.16. Normal response    2. Heart rate response to Valsalva maneuver:  jmxb-ix-jwck BP to Valsalva was measured. Attenuation of late phase 2 and phase 4 BP response is noted. Heart response was reduced. ( VR = 1.14; Normal > 1.29 )  3. HUT (head-up tilt) : Brvc-ng-zcts BP and HR were measured, up to 15 minutes post tilt. No significant BP reduction or HR acceleration/deceleration.   4. SUDOMOTOR: QSWEAT response showed reduce sweat production in proximal leg, distal leg and foot, comparing patient to age group. Impression:   ABNORMAL    1. Attenuated heart rate response to Valsalva maneuver, indicating a mild dysfunction of the cardiovagal (parasympathetic) division. Attenuation of late phase 2 and phase 4 BP response during Valsalva maneuver suggestive of an adrenergic (sympathetic) dysfunction. These findings can be seen in diabetic cardiac autonomic neuropathy. 2. Reduce sweat production in proximal leg, distal leg and foot which can be seen in small fiber polyneuropathy.          Saul Burnette MD  Diplomate, American Board of Psychiatry and Neurology  Diplomate, Neuromuscular Medicine  Diplomate, American Board of Electrodiagnostic Medicine    Note: Raw Data will be scanned separately in Media

## 2021-08-03 NOTE — PROCEDURES
[facility]  [address] [place], [address] [place], [address] [facility]  Phone: [phone]  FAX: [phone]     Clinical Autonomic Testing Report     Patient ID:  Tin Chung  [id number]  80 y.o.  1950     REFERRED BY: Alexa Kennedy MD  PCP: Adriano Pearson MD    Date of Testin/3/2021     Indication/History:     Patient comes in with bradycardia, dizziness, palpitations and paresthesias in fingers and feet. (+) AV block (+) diabetes    Patient is coming for syncope/autonomic dysfunction evaluation. Medications taken 48 hrs before the test: ASA, Lipitor, Metformin, Plavix     Procedure: This Autonomic Nervous System (ANS) testing is performed by utilizing [address] [place] [address] [employer] Autonomic System, with established protocol. Multiple procedures performed: Heart rate response to deep breathing (HRDB), Valsalva ratio, Heart rate and BP response to head up tilt (HUT), and Quantitative sudomotor axon reflex testing (QSWEAT) . Result:  1. Heart response to deep  breathing (HRDB): 2 series of 6 cycles were performed and the mean of 6 consecutive cycles was obtained. Average HR difference was 10.6 and E:I ratio was 1.16. Normal response    2. Heart rate response to Valsalva maneuver:  mqac-th-ktnd BP to Valsalva was measured. Attenuation of late phase 2 and phase 4 BP response is noted. Heart response was reduced. ( VR = 1.14; Normal > 1.29 )  3. HUT (head-up tilt) : Xfql-oj-bsll BP and HR were measured, up to 15 minutes post tilt. No significant BP reduction or HR acceleration/deceleration. 4. SUDOMOTOR: QSWEAT response showed reduce sweat production in proximal leg, distal leg and foot, comparing patient to age group. Impression:   ABNORMAL    1. Attenuated heart rate response to Valsalva maneuver, indicating a mild dysfunction of the cardiovagal (parasympathetic) division.  Attenuation of late phase 2 and phase 4 BP response during Valsalva maneuver suggestive of an adrenergic (sympathetic) dysfunction. These findings can be seen in diabetic cardiac autonomic neuropathy. 2. Reduce sweat production in proximal leg, distal leg and foot which can be seen in small fiber polyneuropathy.          Carl Jones MD  Diplomate, American Board of Psychiatry and Neurology  Diplomate, Neuromuscular Medicine  Diplomate, American Board of Electrodiagnostic Medicine    Note: Raw Data will be scanned separately in Media

## 2021-08-04 ENCOUNTER — OFFICE VISIT (OUTPATIENT)
Dept: CARDIOLOGY CLINIC | Age: 71
End: 2021-08-04
Payer: MEDICARE

## 2021-08-04 VITALS
HEART RATE: 71 BPM | OXYGEN SATURATION: 98 % | BODY MASS INDEX: 24.92 KG/M2 | HEIGHT: 67 IN | WEIGHT: 158.8 LBS | SYSTOLIC BLOOD PRESSURE: 148 MMHG | RESPIRATION RATE: 18 BRPM | DIASTOLIC BLOOD PRESSURE: 58 MMHG

## 2021-08-04 DIAGNOSIS — I44.30 AVB (ATRIOVENTRICULAR BLOCK): ICD-10-CM

## 2021-08-04 DIAGNOSIS — R42 DIZZINESS: Primary | ICD-10-CM

## 2021-08-04 DIAGNOSIS — Z01.812 PRE-PROCEDURE LAB EXAM: ICD-10-CM

## 2021-08-04 DIAGNOSIS — R00.1 BRADYCARDIA: ICD-10-CM

## 2021-08-04 PROCEDURE — G8536 NO DOC ELDER MAL SCRN: HCPCS | Performed by: INTERNAL MEDICINE

## 2021-08-04 PROCEDURE — G8427 DOCREV CUR MEDS BY ELIG CLIN: HCPCS | Performed by: INTERNAL MEDICINE

## 2021-08-04 PROCEDURE — G8420 CALC BMI NORM PARAMETERS: HCPCS | Performed by: INTERNAL MEDICINE

## 2021-08-04 PROCEDURE — 3017F COLORECTAL CA SCREEN DOC REV: CPT | Performed by: INTERNAL MEDICINE

## 2021-08-04 PROCEDURE — 1101F PT FALLS ASSESS-DOCD LE1/YR: CPT | Performed by: INTERNAL MEDICINE

## 2021-08-04 PROCEDURE — G0463 HOSPITAL OUTPT CLINIC VISIT: HCPCS | Performed by: INTERNAL MEDICINE

## 2021-08-04 PROCEDURE — 99215 OFFICE O/P EST HI 40 MIN: CPT | Performed by: INTERNAL MEDICINE

## 2021-08-04 PROCEDURE — G8510 SCR DEP NEG, NO PLAN REQD: HCPCS | Performed by: INTERNAL MEDICINE

## 2021-08-04 NOTE — PROGRESS NOTES
Room #: 5        Visit Vitals  BP (!) 182/80 (BP 1 Location: Right upper arm, BP Patient Position: Sitting, BP Cuff Size: Adult)   Pulse 71   Resp 18   Ht 5' 7\" (1.702 m)   Wt 158 lb 12.8 oz (72 kg)   SpO2 98%   BMI 24.87 kg/m²         Chest pain:  NO  Shortness of breath:  NO  Edema: NO  Palpitations, skipped beats, rapid heartbeat:  NO  Dizziness:  NO    1. Have you been to the ER, urgent care clinic since your last visit? Hospitalized since your last visit? No    2. Have you seen or consulted any other health care providers outside of the [address] [place] [address] since your last visit? Include any pap smears or colon screening.  No      Refills:  NO

## 2021-08-04 NOTE — PROGRESS NOTES
HISTORY OF PRESENTING ILLNESS      Freida Montesinos is a 80 y.o. male with HTN, hypercholesteremia, mitral valve insufficiency, aortic valve stenosis referred for palpitations and shortness of breath with second degree heart block noted on previous holter monitoring (2021). He underwent PCI to the LAD on 2021. Monitor prior demonstrated episodes of bradycardia. He has experienced dizziness, palpitations and paresthesias in his fingers/feet as well. He is currently wearing a monitor and has continued to experience symptoms. Monitor showed episodes of Wenckebach and possible 2:1 AV conduction. He reported palpitations during he was sinus rhythm in the 90s. Monitor was completed and showed continued 2:1 AV block during waking hours. PAST MEDICAL HISTORY     Palpitations  Shortness of breath  Bradycardia  CAD, native  Percutaneous transluminal angioplasty       PAST SURGICAL HISTORY     No past surgical history on file. ALLERGIES     No Known Allergies       FAMILY HISTORY     Family History   Family history unknown: Yes    negative for cardiac disease       SOCIAL HISTORY     Social History     Socioeconomic History    Marital status:      Spouse name: Not on file    Number of children: Not on file    Years of education: Not on file    Highest education level: Not on file   Tobacco Use    Smoking status: Former Smoker     Packs/day: 1.00     Years: 10.00     Pack years: 10.00     Quit date: 1999     Years since quittin.0    Smokeless tobacco: Never Used   Substance and Sexual Activity    Alcohol use: Not Currently   Other Topics Concern     Social Determinants of Health     Financial Resource Strain:     Difficulty of Paying Living Expenses:    Food Insecurity:     Worried About Running Out of Food in the Last Year:     Ran Out of Food in the Last Year:    Transportation Needs:     Lack of Transportation (Medical):      Lack of Transportation (Non-Medical): Physical Activity:     Days of Exercise per Week:     Minutes of Exercise per Session:    Stress:     Feeling of Stress :    Social Connections:     Frequency of Communication with Friends and Family:     Frequency of Social Gatherings with Friends and Family:     Attends Oriental orthodox Services:     Active Member of Clubs or Organizations:     Attends Club or Organization Meetings:     Marital Status:          MEDICATIONS     Current Outpatient Medications   Medication Sig    amLODIPine (NORVASC) 5 mg tablet Take 5 mg by mouth daily.  aspirin delayed-release 81 mg tablet Take 81 mg by mouth daily.  atorvastatin (LIPITOR) 40 mg tablet Take 40 mg by mouth daily.  fenofibrate (LOFIBRA) 160 mg tablet Take 160 mg by mouth daily.  hydrALAZINE (APRESOLINE) 50 mg tablet Take 50 mg by mouth three (3) times daily.  metFORMIN (GLUCOPHAGE) 500 mg tablet Take 500 mg by mouth two (2) times daily (with meals).  potassium chloride SR (KLOR-CON 10) 10 mEq tablet Take  by mouth.  clopidogreL (Plavix) 75 mg tab Take 1 Tablet by mouth daily. Indications: blood clot prevention following percutaneous coronary intervention     No current facility-administered medications for this visit. I have reviewed the nurses notes, vitals, problem list, allergy list, medical history, family, social history and medications. REVIEW OF SYMPTOMS      General: Pt denies excessive weight gain or loss. Pt is able to conduct ADL's  HEENT: Denies blurred vision, headaches, hearing loss, epistaxis and difficulty swallowing. Respiratory: Denies cough, congestion, shortness of breath, HAIRSTON, wheezing or stridor.   Cardiovascular: Denies precordial pain, palpitations, edema or PND  Gastrointestinal: Denies poor appetite, indigestion, abdominal pain or blood in stool  Genitourinary: Denies hematuria, dysuria, increased urinary frequency  Musculoskeletal: Denies joint pain or swelling from muscles or joints  Neurologic: Denies tremor, paresthesias, headache, or sensory motor disturbance  Psychiatric: Denies confusion, insomnia, depression  Integumentray: Denies rash, itching or ulcers. Hematologic: Denies easy bruising, bleeding       PHYSICAL EXAMINATION      Vitals: see vitals section  General: Well developed, in no acute distress. HEENT: No jaundice, oral mucosa moist, no oral ulcers  Neck: Supple, no stiffness, no lymphadenopathy, supple  Heart:  Normal S1/S2 negative S3 or S4. Regular, no murmur, gallop or rub, no jugular venous distention  Respiratory: Clear bilaterally x 4, no wheezing or rales  Abdomen:   Soft, non-tender, bowel sounds are active. Extremities:  No edema, normal cap refill, no cyanosis. Musculoskeletal: No clubbing, no deformities  Neuro: A&Ox3, speech clear, gait stable, cooperative, no focal neurologic deficits  Skin: Skin color is normal. No rashes or lesions. Non diaphoretic, moist.  Vascular: 2+ pulses symmetric in all extremities       DIAGNOSTIC DATA      EKG:   There were no vitals taken for this visit. LABORATORY DATA    No results found for: WBC, HGBPOC, HGB, HGBP, HCTPOC, HCT, PHCT, RBCH, PLT, MCV, HGBEXT, HCTEXT, PLTEXT, HGBEXT, HCTEXT, PLTEXT   Lab Results   Component Value Date/Time    Sodium 132 (L) 06/29/2021 11:31 AM    Potassium 3.9 06/29/2021 11:31 AM    Chloride 102 06/29/2021 11:31 AM    CO2 22 06/29/2021 11:31 AM    Anion gap 8 06/29/2021 11:31 AM    Glucose 120 (H) 06/29/2021 11:31 AM    BUN 20 06/29/2021 11:31 AM    Creatinine 1.01 06/29/2021 11:31 AM    BUN/Creatinine ratio 20 06/29/2021 11:31 AM    GFR est AA >60 06/29/2021 11:31 AM    GFR est non-AA >60 06/29/2021 11:31 AM    Calcium 9.1 06/29/2021 11:31 AM           ASSESSMENT      1. Bradycardia  2. CAD, native  3. Percutaneous transluminal   4. Dyslipidemia  5. Diabetes mellitus  6. Fatigue  7.  Dizziness         PLAN     Plan for dual chamber pacemaker with [employer] after which we will add a trial of diltiazem 120 mg daily to evaluate whether this improves his palpitations. He is due for a visit later this month to discuss ANS testing with Dr. Delcie Spurling         Thank you, Kamille Wan MD and Dr. Doris Sanford for allowing me to participate in the care of this extraordinarily pleasant male. Please do not hesitate to contact me for further questions/concerns.          Giuliano Chong MD  Cardiac Electrophysiology / Cardiology    [place] [address].  [address] [place] [address], [address]  [phone] / [phone] Fax   [phone] / [phone] Fax

## 2021-08-04 NOTE — PATIENT INSTRUCTIONS
You are scheduled for the following procedure with Dr. Kenroy Carrizales:  Charlestine Sin Pacemaker at [facility], [address] [facility], [place], [id number] [place] [address] [place]      PLEASE be aware that your procedure date/time is tentative and subject to change due to emergency cases. Procedure date/time:    August 20, 2021  Time: 8:00 am - please arrive by 7:00 am      ARRIVAL time:  (You will need  to be discharged home with.)     o [facility] procedures: please arrive to check in on 2nd floor two hours prior to your procedure the day of your procedure.    o [facility] procedures: arrive to check in on 1st floor near [facility] entrance two hours prior to your procedure. Pre-procedure Labs/Imaging:    o PRE-PROCEDURE LABS NEEDED: YES   o Forms for labwork may be given at appointment. If not, they will be mailed to you. Labs should be completed within 5-7 days of procedure. These can be done at any [facility] or [facility] lab (one is located in [address] [place] [address]. No appointment needed). NO A COVID swab may be needed 4 days prior to your procedure. o YOU MAY NEED PRE-PROCEDURE IMAGING, CHEST CTA OR CARDIAC MRI.       []  Chest CTA     []  Cardiac MRI    ([facility] scheduling to call you)  -  [phone]          Medication Instructions:     Do not stop the following blood thinner prior to procedure: Aspirin and Plavix         If you take any of the following Diabetic medications, please use as follows:    [X]  Glucophage/Metformin  -  Hold morning dose on day of procedure.    []  Insulin  -  Hold morning dose on day of procedure.    []  Lantus  -  Reduce dose by ½ evening before procedure. Nothing to eat or drink after midnight before your procedure. You may take all other medications as directed the morning of your procedure with a sip of water unless otherwise indicated. Please contact the office [phone] and ask for a member of Dr. Kenroy Carrizales' team for procedure questions.  There is a physician on call for the office after hours for immediate needs. FOLLOW UP:   Your appointments will be made for you post procedure based off of discharge instructions. You may have driving restrictions for a short time after your procedure (usually 2-3 days). Pacemaker/ICD patients will be unable to have an MRI until 6 weeks after implant, NO dental work for 8 weeks after your implant. Pacemaker  Discharge Instructions    Please make sure you have received your Temporary Pacemaker identification card with your discharge instructions      MEDICATIONS         Take only the medications prescribed to you at discharge.  You are prescribed an antibiotic to take for 5-7 days. Please do not miss doses of this prescription. ACTIVITY         Return to your normal activity, except as noted below. o Do not lift anything heavier than 10 pounds for 4 weeks with the affected arm. This is how long it takes the muscles to heal, and the leads inside your heart to stabilize their position. o Do not reach above your head with the affected arm for 4 weeks, doing so increases the risk of lead dislodgement.    o It is, however, important to move the affected arm to prevent shoulder stiffness and locking. o Avoid tight clothes or unnecessary pressure over your incision (such as bra straps or seat belts). If it is tender or sensitive to clothing, cover the incision with a soft dressing or pad.  o Avoid driving for at least 72 hours.   o You may resume all other activities after 4 weeks (including exercise, driving, sex)      SHOWERING         Leave the bandage over your incision until your clinic follow up in 10-14 days after the Pacemaker implant. You bandage will be removed in clinic during that appointment.  It is important to keep the bandaged area clean and dry. You may shower around the site until the bandage is removed in clinic.  Thereafter, you may shower after the bandage is removed, washing it gently with soap and water. Do not apply any lotions, powders, or perfumes to the incision line.  Avoid submerging your incision in water (tub baths, hot tubs, or swimming) for four weeks.  Underneath the dressing.  o You will most likely have a Zipline dressing over the incision. This is made with plastic zip ties to hold the incision together and promote better healing. You may note dried blood around this dressing which is normal. Do not attempt to pull this off.   o If you have white steri-strips over your incision (underneath the gauze dressing), they will curl up at the end and fall off, usually within 10 days. Do not pull them off.  - OR -   o You may have a different type of closure for the incision including a dermabond adhesive which will slowly peel and come off on its own once you are able to shower. DISCHARGE PRECAUTIONS         Record your temperature every day, at the same time, until your 10-14 day follow up. A temperature of 100.5 F, or higher, can be the first sign of infection. This should be reported to your Doctor immediately.  You can have an MRI after 6 weeks. You must be aware that any strong magnet or magnetic field can affect your Pacemaker. In general, be careful of metal detectors, heavy machinery, and any area where arc-welding is performed. When approaching a security checkpoint show your Pacemaker ID Card to security personnel.  Always tell your doctor or dentist that you have a Pacemaker. In some cases, antibiotics may be prescribed before certain procedures.  Your temporary identification will be given to you with these instructions. Keep your Pacemaker card in your wallet or on your person at all times. You should receive your permanent card, although this may take up to 8-12 weeks.   If you do not receive your permanent card, please call the office at [phone] or the phone number provided on your temporary card for the pacemaker company. TAKING YOUR PULSE         Take your pulse the same time every day, preferably in the morning, until your follow up.  Sit down and rest for 5 minutes prior to taking your pulse.  Take your pulse for 1 full minute, use a clock or stop watch with a second hand.  To feel your pulse, use the first two fingers of one hand; place them on the thumb side of the wrist of the opposite hand. The pulse will be steady, regular and throbbing.  Call the physician if your pulse is less than 40 beats per minute. SYMPTOMS THAT NEED TO BE REPORTED IMMEDIATELY         Temperature more than 100.4 F     Redness or warmth at the incision site, or pain for longer than the first 5 days after the implant.  Drainage from the incision site.  Swelling around the incision site.  Shortness of breath.  Rapid heart rate or palpitations.  Dizziness, lightheadedness, fainting.  Slow pulse below 40 beats per minute.  REMEMBER: If you feel something is an emergency or cannot be handled over the phone, call 911 or go to the closest emergency room. Raymond Pozo MD  Cardiac Electrophysiology / Cardiology    [place] [address].  [address] [place] [address], [address] [place] [address]  [phone] / [phone] Fax       [phone] / [phone] Fax              Restrictions for Pacemakers and ICDs   Follow up will be scheduled for 10-14 days post implant with our device clinic and Izzy Varghese NP and then 3 months post implant with Dr. Jonny Naik. This is subject to change based off of discharge orders placed by MD or [place].    Restrictions:    NO raising affected arm above shoulder height until: 1 month (or 4 weeks) post implant   NO lifting (with affected arm) heavier than 10 pounds until:  1 month (or 4 weeks) post implant, slowly work back into regular activity after   NO fast, swinging motions (raking, golfing/swimming/power walking) until:  1 month (or 4 weeks) post implant, slowly work back into regular activity after   NO arc welding or chainsaw use: ICD: NOT allowed  Pacemaker: 1 month (or 4 weeks) post implant. Refer to patient number on device card to reference certain equipment. NO soaking in water (bathtub, hot tub, pool, river, ocean, etc.): 1 month (or 4 weeks) post implant or until completely healed   Allowed to shower: Ok to get bandage wet, OK to shower after bandage removed. Do not let shower beat on incision site. Pat dry. NO dental work for 8 weeks after your implant. (antibiotics only needed with certain procedures)   MRI compatible devices AND leads:  Must wait until 6 weeks after implant. DRIVING: No driving for 3 days, or longer depending on MD's recommendations. You must wear seatbelt per [place]. If this is uncomfortable, use a pad or towel over the site or avoid driving until discomfort lessens. **Remote monitoring is STRONGLY recommended for our device clinic and instruction will be given based off of your device and device company.

## 2021-08-05 ENCOUNTER — HOSPITAL ENCOUNTER (OUTPATIENT)
Dept: CARDIAC REHAB | Age: 71
Discharge: HOME | End: 2021-08-05
Payer: MEDICARE

## 2021-08-05 VITALS — WEIGHT: 161.2 LBS | BODY MASS INDEX: 25.25 KG/M2

## 2021-08-05 PROCEDURE — 93798 PHYS/QHP OP CAR RHAB W/ECG: CPT

## 2021-08-06 ENCOUNTER — HOSPITAL ENCOUNTER (OUTPATIENT)
Dept: CARDIAC REHAB | Age: 71
Discharge: HOME | End: 2021-08-06
Payer: MEDICARE

## 2021-08-06 VITALS — BODY MASS INDEX: 25.25 KG/M2 | WEIGHT: 161.2 LBS

## 2021-08-06 PROCEDURE — 93798 PHYS/QHP OP CAR RHAB W/ECG: CPT

## 2021-08-10 ENCOUNTER — HOSPITAL ENCOUNTER (OUTPATIENT)
Dept: CARDIAC REHAB | Age: 71
Discharge: HOME | End: 2021-08-10
Payer: MEDICARE

## 2021-08-10 ENCOUNTER — PREP FOR PROCEDURE (OUTPATIENT)
Dept: CARDIOLOGY CLINIC | Age: 71
End: 2021-08-10

## 2021-08-10 VITALS — WEIGHT: 156.8 LBS | BODY MASS INDEX: 24.56 KG/M2

## 2021-08-10 PROCEDURE — 93798 PHYS/QHP OP CAR RHAB W/ECG: CPT

## 2021-08-10 PROCEDURE — 93797 PHYS/QHP OP CAR RHAB WO ECG: CPT

## 2021-08-10 RX ORDER — SODIUM CHLORIDE 0.9 % (FLUSH) 0.9 %
5-40 SYRINGE (ML) INJECTION EVERY 8 HOURS
Status: CANCELLED | OUTPATIENT
Start: 2021-08-10

## 2021-08-10 RX ORDER — SODIUM CHLORIDE 0.9 % (FLUSH) 0.9 %
5-40 SYRINGE (ML) INJECTION AS NEEDED
Status: CANCELLED | OUTPATIENT
Start: 2021-08-10

## 2021-08-12 ENCOUNTER — HOSPITAL ENCOUNTER (OUTPATIENT)
Dept: CARDIAC REHAB | Age: 71
Discharge: HOME | End: 2021-08-12
Payer: MEDICARE

## 2021-08-12 VITALS — WEIGHT: 161.2 LBS | BODY MASS INDEX: 25.25 KG/M2

## 2021-08-12 PROCEDURE — 93797 PHYS/QHP OP CAR RHAB WO ECG: CPT

## 2021-08-12 PROCEDURE — 93798 PHYS/QHP OP CAR RHAB W/ECG: CPT

## 2021-08-12 NOTE — CARDIO/PULMONARY
Cardiac Rehab Nutrition Assessment - 1:1 Evaluation           NAME: Octavio Wilkes : 1950 AGE: 80 y.o. GENDER: male  CARDIAC REHAB ADMITTING DIAGNOSIS: stent (21)    PROBLEM LIST:  There is no problem list on file for this patient. DM, HTN, HLD, aortic stenosis      LABS:   No results found for: HBA1C, ZKA7FVWH, GPI0TSDD  No results found for: CHOL, CHOLPOCT, CHOLX, CHLST, CHOLV, HDL, HDLPOC, HDLP, LDL, LDLCPOC, LDLC, DLDLP, VLDLC, VLDL, TGLX, TRIGL, TRIGP, TGLPOCT, CHHD, CHHDX    21 Total Chol 115, HDL-C 50, LDL-C 50, Trig 75, Non-HDL 65    21 A1c 6.7    SMBG multiple times per day; has had multiple episodes of hypoglycemia      MEDICATIONS/SUPPLEMENTS:   [unfilled]  Prior to Admission medications    Medication Sig Start Date End Date Taking? Authorizing Provider   amLODIPine (NORVASC) 5 mg tablet Take 5 mg by mouth daily. Provider, Historical   aspirin delayed-release 81 mg tablet Take 81 mg by mouth daily. Provider, Historical   atorvastatin (LIPITOR) 40 mg tablet Take 40 mg by mouth daily. Provider, Historical   fenofibrate (LOFIBRA) 160 mg tablet Take 160 mg by mouth daily. Provider, Historical   hydrALAZINE (APRESOLINE) 50 mg tablet Take 50 mg by mouth three (3) times daily. Provider, Historical   metFORMIN (GLUCOPHAGE) 500 mg tablet Take 500 mg by mouth two (2) times daily (with meals). Provider, Historical   potassium chloride SR (KLOR-CON 10) 10 mEq tablet Take  by mouth. Provider, Historical   clopidogreL (Plavix) 75 mg tab Take 1 Tablet by mouth daily.  Indications: blood clot prevention following percutaneous coronary intervention 21   Juancho Honeycutt MD     lantus 10 units/day; humalog as needed (has not been needing/taking)      ANTHROPOMETRICS:    Ht Readings from Last 1 Encounters:   21 5' 7\" (1.702 m)      Wt Readings from Last 10 Encounters:   08/10/21 71.1 kg (156 lb 12.8 oz)   21 73.1 kg (161 lb 3.2 oz)   21 73.1 kg (161 lb 3.2 oz)   08/04/21 72 kg (158 lb 12.8 oz)   08/03/21 72.5 kg (159 lb 12.8 oz)   07/29/21 73.5 kg (162 lb)   07/14/21 75.8 kg (167 lb)   06/29/21 75.7 kg (166 lb 14.4 oz)      IBW:148 # +/- 10%  %IBW: 154 % +/- 10%    BMI: 24.6 kg/M2 Category: Normal  Waist: 38.25 inches    Reported Wt Hx: lost 35 lbs over the past 18 months with increased exercise    Reported Diet Hx:    Rate Your Plate Score: 64  (Score 58-72: Making many healthy choices; 41-57: Some choices need improving 24-40: many choices need improving)    24 HOUR DIET RECALL  Breakfast Oatmeal w/ fruit made with 1% milk, or 1 cup Cheerios multigrain cereal w/ 1% milk +/- banana, egg whites bread (whole wheat)   Lunch Brown rice, salmon or chicken, green vegetables, beans   Dinner [place], 1 cup brown rice, a few green beans, bell peppers   Snacks Crackers or low fat yogurt or wheat bread with PB   Beverages OJ when BG low, water, green tea rarely     Fanatic[employer] and his daughter share in the grocery shopping & cooking. He has cut out salt, red meat, eating more vegetables, whole grains, since his stent. He has been tracking his BG and has cut back on some of his [place] after finding it raised his BG. He has been following a very strict, narrow diet due to uncertainty about what is okay for him to eat; looking for more variety and flavor that will be good for him. Environmental/Social: Mr. Mamie Garcia lives with his supportive daughter, he is retired ; he exercises / walks 30 minutes 1 to 2 times per day        NUTRITION INTERVENTION:  Nutrition 60 minute one-on-one education & goal setting with [employer]    Reviewed with Fanatic[employer] relevant labs compared to ideals. Reviewed weight history and patient's verbalized weight goal as well as any real or perceived barriers to obtaining the goal. Collaborated with patient to set a specific short and long term weight goal.     Reviewed Rate Your Plate and conducted a verbal diet recall. Assessed for environmental, financial, psychosocial, physical and comorbidities that may impact the food and eating patterns / behaviors of Jonatan Torres with patient to set specific nutrient goals as well as specific food / behavior changes that will help patient meet the overall goal of following a heart healthy eating pattern (using guidelines as set forth by the American Heart Association and modeled after healthful eating patterns as recognized by the USDA Dietary Guidelines such as DASH, Mediterranean or plant-based). Briefly reviewed with Vern Clifton the nutrition information in the Cardiac Rehab patient education book and encouraged Vern Clifton to read thoroughly, ask questions as needed, and use for future reference for heart healthy nutrition information. Vern Clifton is scheduled to participate in Cardiac Rehab group nutrition classes. PATIENT GOALS:    Weight Goals:  Short Term Weight Goal: 160 lbs (clinic scales)  Long Term Weight Goal:155-160 lbs (home scales)    Nutrition Goals:  Daily Recommendations:  Calories: 2000 /day  (using Lizz Fu with AF 1.4)    Saturated Fat: no more than 13.5 g/day  Trans Fat: 0 g/day  Sodium: no more than 1500 mg/day  Fruit: 3-4 cups / day (no more than 1 cup at a time; 1/4 cup dried fruit = 1 cup whole)  Vegetables: 3-4 or more cups/day    Other:  - read and compare food labels  - increase variety (Discussed at length multiple options for variety in meals and snacks. Recipes and snack idea handouts provided)  - use salt free seasonings such as fresh or dried herbs, spices, vinegars, citrus juice/zest for flavor    Also encouraged he and his daughter to call his doctor regarding frequent BG readings <70 on 10U lantus. Questions addressed. Follow-up plans discussed. Vern Clifton verbalized understanding.             Paris Trotter RD

## 2021-08-13 ENCOUNTER — HOSPITAL ENCOUNTER (OUTPATIENT)
Dept: CARDIAC REHAB | Age: 71
Discharge: HOME | End: 2021-08-13
Payer: MEDICARE

## 2021-08-13 VITALS — WEIGHT: 156.3 LBS | BODY MASS INDEX: 24.48 KG/M2

## 2021-08-13 PROCEDURE — 93798 PHYS/QHP OP CAR RHAB W/ECG: CPT

## 2021-08-17 ENCOUNTER — HOSPITAL ENCOUNTER (OUTPATIENT)
Dept: CARDIAC REHAB | Age: 71
Discharge: HOME | End: 2021-08-17
Payer: MEDICARE

## 2021-08-17 VITALS — WEIGHT: 160.4 LBS | BODY MASS INDEX: 25.12 KG/M2

## 2021-08-17 LAB
APPEARANCE UR: CLEAR
BACTERIA #/AREA URNS HPF: NORMAL /[HPF]
BILIRUB UR QL STRIP: NEGATIVE
BUN SERPL-MCNC: 10 MG/DL (ref 8–27)
BUN/CREAT SERPL: 15 (ref 10–24)
CALCIUM SERPL-MCNC: 9.5 MG/DL (ref 8.6–10.2)
CASTS URNS QL MICRO: NORMAL /LPF
CHLORIDE SERPL-SCNC: 100 MMOL/L (ref 96–106)
CO2 SERPL-SCNC: 21 MMOL/L (ref 20–29)
COLOR UR: YELLOW
CREAT SERPL-MCNC: 0.67 MG/DL (ref 0.76–1.27)
EPI CELLS #/AREA URNS HPF: NORMAL /HPF (ref 0–10)
ERYTHROCYTE [DISTWIDTH] IN BLOOD BY AUTOMATED COUNT: 12.6 % (ref 11.6–15.4)
GLUCOSE SERPL-MCNC: 100 MG/DL (ref 65–99)
GLUCOSE UR QL: NEGATIVE
HCT VFR BLD AUTO: 34.5 % (ref 37.5–51)
HGB BLD-MCNC: 11.2 G/DL (ref 13–17.7)
HGB UR QL STRIP: NEGATIVE
INR PPP: 1 (ref 0.9–1.2)
KETONES UR QL STRIP: NEGATIVE
LEUKOCYTE ESTERASE UR QL STRIP: NEGATIVE
MCH RBC QN AUTO: 29.1 PG (ref 26.6–33)
MCHC RBC AUTO-ENTMCNC: 32.5 G/DL (ref 31.5–35.7)
MCV RBC AUTO: 90 FL (ref 79–97)
MICRO URNS: NORMAL
MICRO URNS: NORMAL
NITRITE UR QL STRIP: NEGATIVE
PH UR STRIP: 6 [PH] (ref 5–7.5)
POTASSIUM SERPL-SCNC: 4.2 MMOL/L (ref 3.5–5.2)
PROT UR QL STRIP: NEGATIVE
PROTHROMBIN TIME: 10.3 SEC (ref 9.1–12)
RBC # BLD AUTO: 3.85 X10E6/UL (ref 4.14–5.8)
RBC #/AREA URNS HPF: NORMAL /HPF (ref 0–2)
SODIUM SERPL-SCNC: 135 MMOL/L (ref 134–144)
SP GR UR: 1.01 (ref 1–1.03)
UROBILINOGEN UR STRIP-MCNC: 0.2 MG/DL (ref 0.2–1)
WBC # BLD AUTO: 4.7 X10E3/UL (ref 3.4–10.8)
WBC #/AREA URNS HPF: NORMAL /HPF (ref 0–5)

## 2021-08-17 PROCEDURE — 93798 PHYS/QHP OP CAR RHAB W/ECG: CPT

## 2021-08-17 PROCEDURE — 93797 PHYS/QHP OP CAR RHAB WO ECG: CPT

## 2021-08-19 ENCOUNTER — HOSPITAL ENCOUNTER (OUTPATIENT)
Dept: CARDIAC REHAB | Age: 71
Discharge: HOME | End: 2021-08-19
Payer: MEDICARE

## 2021-08-19 VITALS — BODY MASS INDEX: 24.43 KG/M2 | WEIGHT: 156 LBS

## 2021-08-19 PROCEDURE — 93798 PHYS/QHP OP CAR RHAB W/ECG: CPT

## 2021-08-20 ENCOUNTER — APPOINTMENT (OUTPATIENT)
Dept: CARDIAC REHAB | Age: 71
End: 2021-08-20
Payer: MEDICARE

## 2021-08-20 ENCOUNTER — HOSPITAL ENCOUNTER (OUTPATIENT)
Age: 71
Setting detail: OUTPATIENT SURGERY
Discharge: HOME | End: 2021-08-20
Attending: INTERNAL MEDICINE | Admitting: INTERNAL MEDICINE
Payer: MEDICARE

## 2021-08-20 ENCOUNTER — APPOINTMENT (OUTPATIENT)
Dept: GENERAL RADIOLOGY | Age: 71
End: 2021-08-20
Attending: INTERNAL MEDICINE
Payer: MEDICARE

## 2021-08-20 VITALS
OXYGEN SATURATION: 100 % | RESPIRATION RATE: 17 BRPM | BODY MASS INDEX: 24.88 KG/M2 | HEART RATE: 79 BPM | WEIGHT: 158.5 LBS | HEIGHT: 67 IN | SYSTOLIC BLOOD PRESSURE: 156 MMHG | TEMPERATURE: 98 F | DIASTOLIC BLOOD PRESSURE: 66 MMHG

## 2021-08-20 DIAGNOSIS — R42 DIZZINESS AND GIDDINESS: ICD-10-CM

## 2021-08-20 PROCEDURE — 33208 INSRT HEART PM ATRIAL & VENT: CPT | Performed by: INTERNAL MEDICINE

## 2021-08-20 PROCEDURE — 77030033138 HC SUT PGA STRATFX J&J -B: Performed by: INTERNAL MEDICINE

## 2021-08-20 PROCEDURE — C1892 INTRO/SHEATH,FIXED,PEEL-AWAY: HCPCS | Performed by: INTERNAL MEDICINE

## 2021-08-20 PROCEDURE — 74011000636 HC RX REV CODE- 636: Performed by: INTERNAL MEDICINE

## 2021-08-20 PROCEDURE — 71045 X-RAY EXAM CHEST 1 VIEW: CPT

## 2021-08-20 PROCEDURE — 99153 MOD SED SAME PHYS/QHP EA: CPT | Performed by: INTERNAL MEDICINE

## 2021-08-20 PROCEDURE — 77030018729 HC ELECTRD DEFIB PAD CARD -B: Performed by: INTERNAL MEDICINE

## 2021-08-20 PROCEDURE — 74011000250 HC RX REV CODE- 250: Performed by: INTERNAL MEDICINE

## 2021-08-20 PROCEDURE — 99152 MOD SED SAME PHYS/QHP 5/>YRS: CPT | Performed by: INTERNAL MEDICINE

## 2021-08-20 PROCEDURE — 2709999900 HC NON-CHARGEABLE SUPPLY: Performed by: INTERNAL MEDICINE

## 2021-08-20 PROCEDURE — 77030038613 HC SUT PDS STRATA SPIRL J&J -B: Performed by: INTERNAL MEDICINE

## 2021-08-20 PROCEDURE — 77030041279 HC DRSG PRMSL AG MDII -B: Performed by: INTERNAL MEDICINE

## 2021-08-20 PROCEDURE — 77030037713 HC CLOSR DEV INCIS ZIP STRY -B: Performed by: INTERNAL MEDICINE

## 2021-08-20 PROCEDURE — C1898 LEAD, PMKR, OTHER THAN TRANS: HCPCS | Performed by: INTERNAL MEDICINE

## 2021-08-20 PROCEDURE — 77030018547 HC SUT ETHBND1 J&J -B: Performed by: INTERNAL MEDICINE

## 2021-08-20 PROCEDURE — A4565 SLINGS: HCPCS | Performed by: INTERNAL MEDICINE

## 2021-08-20 PROCEDURE — 74011250636 HC RX REV CODE- 250/636: Performed by: INTERNAL MEDICINE

## 2021-08-20 PROCEDURE — C1785 PMKR, DUAL, RATE-RESP: HCPCS | Performed by: INTERNAL MEDICINE

## 2021-08-20 DEVICE — LEAD 5076-58 MRI US RCMCRD
Type: IMPLANTABLE DEVICE | Status: FUNCTIONAL
Brand: CAPSUREFIX NOVUS MRI™ SURESCAN®

## 2021-08-20 DEVICE — IPG W1DR01 AZURE XT DR MRI USA
Type: IMPLANTABLE DEVICE | Status: FUNCTIONAL
Brand: AZURE™ XT DR MRI SURESCAN™

## 2021-08-20 DEVICE — LEAD 5076-52 MRI US RCMCRD
Type: IMPLANTABLE DEVICE | Status: FUNCTIONAL
Brand: CAPSUREFIX NOVUS MRI™ SURESCAN®

## 2021-08-20 RX ORDER — MIDAZOLAM HYDROCHLORIDE 1 MG/ML
.5-1 INJECTION, SOLUTION INTRAMUSCULAR; INTRAVENOUS
Status: DISCONTINUED | OUTPATIENT
Start: 2021-08-20 | End: 2021-08-20 | Stop reason: HOSPADM

## 2021-08-20 RX ORDER — BUPIVACAINE HYDROCHLORIDE 5 MG/ML
15 INJECTION, SOLUTION EPIDURAL; INTRACAUDAL ONCE
Status: DISCONTINUED | OUTPATIENT
Start: 2021-08-20 | End: 2021-08-20 | Stop reason: HOSPADM

## 2021-08-20 RX ORDER — ONDANSETRON 2 MG/ML
4 INJECTION INTRAMUSCULAR; INTRAVENOUS
Status: CANCELLED | OUTPATIENT
Start: 2021-08-20

## 2021-08-20 RX ORDER — ACETAMINOPHEN 325 MG/1
650 TABLET ORAL
Status: CANCELLED | OUTPATIENT
Start: 2021-08-20

## 2021-08-20 RX ORDER — HEPARIN SODIUM 200 [USP'U]/100ML
500 INJECTION, SOLUTION INTRAVENOUS ONCE
Status: COMPLETED | OUTPATIENT
Start: 2021-08-20 | End: 2021-08-20

## 2021-08-20 RX ORDER — CEPHALEXIN 500 MG/1
500 CAPSULE ORAL 3 TIMES DAILY
Qty: 15 CAPSULE | Refills: 0 | Status: SHIPPED | OUTPATIENT
Start: 2021-08-20 | End: 2021-08-25

## 2021-08-20 RX ORDER — CEFAZOLIN SODIUM 1 G/3ML
2 INJECTION, POWDER, FOR SOLUTION INTRAMUSCULAR; INTRAVENOUS ONCE
Status: COMPLETED | OUTPATIENT
Start: 2021-08-20 | End: 2021-08-20

## 2021-08-20 RX ORDER — GENTAMICIN SULFATE 80 MG/100ML
80 INJECTION, SOLUTION INTRAVENOUS ONCE
Status: DISCONTINUED | OUTPATIENT
Start: 2021-08-20 | End: 2021-08-20 | Stop reason: SDUPTHER

## 2021-08-20 RX ORDER — GENTAMICIN SULFATE 80 MG/100ML
80 INJECTION, SOLUTION INTRAVENOUS ONCE
Status: COMPLETED | OUTPATIENT
Start: 2021-08-20 | End: 2021-08-20

## 2021-08-20 RX ORDER — SODIUM CHLORIDE 0.9 % (FLUSH) 0.9 %
5-40 SYRINGE (ML) INJECTION EVERY 8 HOURS
Status: CANCELLED | OUTPATIENT
Start: 2021-08-20

## 2021-08-20 RX ORDER — FENTANYL CITRATE 50 UG/ML
25-200 INJECTION, SOLUTION INTRAMUSCULAR; INTRAVENOUS
Status: DISCONTINUED | OUTPATIENT
Start: 2021-08-20 | End: 2021-08-20 | Stop reason: HOSPADM

## 2021-08-20 RX ORDER — SODIUM CHLORIDE 0.9 % (FLUSH) 0.9 %
5-40 SYRINGE (ML) INJECTION AS NEEDED
Status: CANCELLED | OUTPATIENT
Start: 2021-08-20

## 2021-08-20 RX ORDER — LIDOCAINE HYDROCHLORIDE AND EPINEPHRINE 10; 10 MG/ML; UG/ML
10 INJECTION, SOLUTION INFILTRATION; PERINEURAL
Status: DISCONTINUED | OUTPATIENT
Start: 2021-08-20 | End: 2021-08-20 | Stop reason: HOSPADM

## 2021-08-20 RX ORDER — HYDROCODONE BITARTRATE AND ACETAMINOPHEN 5; 325 MG/1; MG/1
1 TABLET ORAL
Status: CANCELLED | OUTPATIENT
Start: 2021-08-20

## 2021-08-20 RX ADMIN — FENTANYL CITRATE 25 MCG: 50 INJECTION, SOLUTION INTRAMUSCULAR; INTRAVENOUS at 08:32

## 2021-08-20 RX ADMIN — GENTAMICIN SULFATE 80 MG: 80 INJECTION, SOLUTION INTRAVENOUS at 08:19

## 2021-08-20 RX ADMIN — MIDAZOLAM 1 MG: 1 INJECTION INTRAMUSCULAR; INTRAVENOUS at 08:17

## 2021-08-20 RX ADMIN — HEPARIN SODIUM 1000 UNITS: 200 INJECTION, SOLUTION INTRAVENOUS at 08:18

## 2021-08-20 RX ADMIN — MIDAZOLAM 1 MG: 1 INJECTION INTRAMUSCULAR; INTRAVENOUS at 08:20

## 2021-08-20 RX ADMIN — IOPAMIDOL 10 ML: 755 INJECTION, SOLUTION INTRAVENOUS at 08:28

## 2021-08-20 RX ADMIN — VANCOMYCIN HYDROCHLORIDE: 1 INJECTION, POWDER, LYOPHILIZED, FOR SOLUTION INTRAVENOUS at 08:00

## 2021-08-20 RX ADMIN — FENTANYL CITRATE 75 MCG: 50 INJECTION, SOLUTION INTRAMUSCULAR; INTRAVENOUS at 08:20

## 2021-08-20 RX ADMIN — CEFAZOLIN 2 G: 1 INJECTION, POWDER, FOR SOLUTION INTRAMUSCULAR; INTRAVENOUS at 08:17

## 2021-08-20 RX ADMIN — LIDOCAINE HYDROCHLORIDE AND EPINEPHRINE 100 MG: 10; 10 INJECTION, SOLUTION INFILTRATION; PERINEURAL at 08:29

## 2021-08-20 NOTE — DISCHARGE INSTRUCTIONS
Pacemaker  Discharge Instructions    Please make sure you have received your Temporary Pacemaker identification card with your discharge instructions      MEDICATIONS         Take only the medications prescribed to you at discharge.  You are prescribed an antibiotic to take for 5-7 days. Please do not miss doses of this prescription. ACTIVITY         Return to your normal activity, except as noted below. o Do not lift anything heavier than 10 pounds for 4 weeks with the affected arm. This is how long it takes the muscles to heal, and the leads inside your heart to stabilize their position. o Do not reach above your head with the affected arm for 4 weeks, doing so increases the risk of lead dislodgement.    o It is, however, important to move the affected arm to prevent shoulder stiffness and locking. o Avoid tight clothes or unnecessary pressure over your incision (such as bra straps or seat belts). If it is tender or sensitive to clothing, cover the incision with a soft dressing or pad.  o Avoid driving for at least 72 hours.   o You may resume all other activities after 4 weeks (including exercise, driving, sex)      SHOWERING         Leave the bandage over your incision until your clinic follow up in 10-14 days after the Pacemaker implant. You bandage will be removed in clinic during that appointment.  It is important to keep the bandaged area clean and dry. You may shower around the site until the bandage is removed in clinic. Thereafter, you may shower after the bandage is removed, washing it gently with soap and water. Do not apply any lotions, powders, or perfumes to the incision line.  Avoid submerging your incision in water (tub baths, hot tubs, or swimming) for four weeks.  Underneath the dressing.  o You will most likely have a Zipline dressing over the incision. This is made with plastic zip ties to hold the incision together and promote better healing.  You may note dried blood around this dressing which is normal. Do not attempt to pull this off.   o If you have white steri-strips over your incision (underneath the gauze dressing), they will curl up at the end and fall off, usually within 10 days. Do not pull them off.  - OR -   o You may have a different type of closure for the incision including a dermabond adhesive which will slowly peel and come off on its own once you are able to shower. DISCHARGE PRECAUTIONS         Record your temperature every day, at the same time, until your 10-14 day follow up. A temperature of 100.5 F, or higher, can be the first sign of infection. This should be reported to your Doctor immediately.  You can have an MRI after 6 weeks. You must be aware that any strong magnet or magnetic field can affect your Pacemaker. In general, be careful of metal detectors, heavy machinery, and any area where arc-welding is performed. When approaching a security checkpoint show your Pacemaker ID Card to security personnel.  Always tell your doctor or dentist that you have a Pacemaker. In some cases, antibiotics may be prescribed before certain procedures.  Your temporary identification will be given to you with these instructions. Keep your Pacemaker card in your wallet or on your person at all times. You should receive your permanent card, although this may take up to 8-12 weeks. If you do not receive your permanent card, please call the office at [phone] or the phone number provided on your temporary card for the pacemaker company. TAKING YOUR PULSE         Take your pulse the same time every day, preferably in the morning, until your follow up.  Sit down and rest for 5 minutes prior to taking your pulse.  Take your pulse for 1 full minute, use a clock or stop watch with a second hand.      To feel your pulse, use the first two fingers of one hand; place them on the thumb side of the wrist of the opposite hand.  The pulse will be steady, regular and throbbing.  Call the physician if your pulse is less than 40 beats per minute. SYMPTOMS THAT NEED TO BE REPORTED IMMEDIATELY         Temperature more than 100.4 F     Redness or warmth at the incision site, or pain for longer than the first 5 days after the implant.  Drainage from the incision site.  Swelling around the incision site.  Shortness of breath.  Rapid heart rate or palpitations.  Dizziness, lightheadedness, fainting.  Slow pulse below 40 beats per minute.  REMEMBER: If you feel something is an emergency or cannot be handled over the phone, call 911 or go to the closest emergency room.       Alexa Kennedy MD  Cardiac Electrophysiology / Cardiology    [address] [address], [address] [facility], [address]  [place], [facility] [place]note 2[facility] [address].         Leander Ronquillo  [phone] / [phone] Fax       [phone] / [phone] Fax

## 2021-08-20 NOTE — ROUTINE PROCESS
7:11 AM  Patient brought back from the waiting room. ID band and allergies confirmed. Pre-signed consents reviewed. 9:01 AM  TRANSFER - IN REPORT:    [facility] from [facility] Lab (unit) for routine progression of care      Report consisted of patients Situation, Background, Assessment and   Recommendations(SBAR). Information from the following report(s) SBAR was reviewed with the receiving nurse. Opportunity for questions and clarification was provided. Assessment completed upon patients arrival to unit and care assumed. 10:30 AM  Patient ambulates in hallway. Patient experienced no dizziness or gait disturbances. Patient voided without issue. 10:40 AM  Discharge instructions reviewed with patient and family, Daughter. Voiced understanding. Patient given copy of discharge instructions to take home. 11:05 AM  Pt discharged via wheelchair with daughter. Personal belongings with patient upon discharge. Patient sent home with [employer] device box and pacemaker card. Patient also given a sling and ice pack for affected site.

## 2021-08-20 NOTE — H&P
HISTORY OF PRESENTING ILLNESS      Belen Lafleur is a 80 y.o. male with HTN, hypercholesteremia, mitral valve insufficiency, aortic valve stenosis referred for palpitations and shortness of breath with second degree heart block noted on previous holter monitoring (2021). He underwent PCI to the LAD on 2021. Monitor prior demonstrated episodes of bradycardia. He has experienced dizziness, palpitations and paresthesias in his fingers/feet as well. He is currently wearing a monitor and has continued to experience symptoms. Monitor showed episodes of Wenckebach and possible 2:1 AV conduction. He reported palpitations during he was sinus rhythm in the 90s. PAST MEDICAL HISTORY     Palpitations  Shortness of breath  Bradycardia  CAD, native  Percutaneous transluminal angioplasty       PAST SURGICAL HISTORY     No past surgical history on file. ALLERGIES     No Known Allergies       FAMILY HISTORY     Family History   Family history unknown: Yes    negative for cardiac disease       SOCIAL HISTORY     Social History     Socioeconomic History    Marital status:      Spouse name: Not on file    Number of children: Not on file    Years of education: Not on file    Highest education level: Not on file   Tobacco Use    Smoking status: Former Smoker     Packs/day: 1.00     Years: 10.00     Pack years: 10.00     Quit date: 1999     Years since quittin.1    Smokeless tobacco: Never Used   Substance and Sexual Activity    Alcohol use: Not Currently   Other Topics Concern     Social Determinants of Health     Financial Resource Strain:     Difficulty of Paying Living Expenses:    Food Insecurity:     Worried About Running Out of Food in the Last Year:     Ran Out of Food in the Last Year:    Transportation Needs:     Lack of Transportation (Medical):      Lack of Transportation (Non-Medical):    Physical Activity:     Days of Exercise per Week:     Minutes of Exercise per Session:    Stress:     Feeling of Stress :    Social Connections:     Frequency of Communication with Friends and Family:     Frequency of Social Gatherings with Friends and Family:     Attends Baptism Services:     Active Member of Clubs or Organizations:     Attends Club or Organization Meetings:     Marital Status:          MEDICATIONS     No current facility-administered medications for this encounter. I have reviewed the nurses notes, vitals, problem list, allergy list, medical history, family, social history and medications. REVIEW OF SYMPTOMS      General: Pt denies excessive weight gain or loss. Pt is able to conduct ADL's  HEENT: Denies blurred vision, headaches, hearing loss, epistaxis and difficulty swallowing. Respiratory: Denies cough, congestion, shortness of breath, HAIRSTON, wheezing or stridor. Cardiovascular: Denies precordial pain, palpitations, edema or PND  Gastrointestinal: Denies poor appetite, indigestion, abdominal pain or blood in stool  Genitourinary: Denies hematuria, dysuria, increased urinary frequency  Musculoskeletal: Denies joint pain or swelling from muscles or joints  Neurologic: Denies tremor, paresthesias, headache, or sensory motor disturbance  Psychiatric: Denies confusion, insomnia, depression  Integumentray: Denies rash, itching or ulcers. Hematologic: Denies easy bruising, bleeding       PHYSICAL EXAMINATION      Vitals: see vitals section  General: Well developed, in no acute distress. HEENT: No jaundice, oral mucosa moist, no oral ulcers  Neck: Supple, no stiffness, no lymphadenopathy, supple  Heart:  Normal S1/S2 negative S3 or S4. Regular, no murmur, gallop or rub, no jugular venous distention  Respiratory: Clear bilaterally x 4, no wheezing or rales  Abdomen:   Soft, non-tender, bowel sounds are active. Extremities:  No edema, normal cap refill, no cyanosis.   Musculoskeletal: No clubbing, no deformities  Neuro: A&Ox3, speech clear, gait stable, cooperative, no focal neurologic deficits  Skin: Skin color is normal. No rashes or lesions. Non diaphoretic, moist.  Vascular: 2+ pulses symmetric in all extremities       DIAGNOSTIC DATA           LABORATORY DATA      Lab Results   Component Value Date/Time    WBC 4.7 08/16/2021 11:29 AM    HGB 11.2 (L) 08/16/2021 11:29 AM    HCT 34.5 (L) 08/16/2021 11:29 AM    MCV 90 08/16/2021 11:29 AM      Lab Results   Component Value Date/Time    Sodium 135 08/16/2021 11:29 AM    Potassium 4.2 08/16/2021 11:29 AM    Chloride 100 08/16/2021 11:29 AM    CO2 21 08/16/2021 11:29 AM    Anion gap 8 06/29/2021 11:31 AM    Glucose 100 (H) 08/16/2021 11:29 AM    BUN 10 08/16/2021 11:29 AM    Creatinine 0.67 (L) 08/16/2021 11:29 AM    BUN/Creatinine ratio 15 08/16/2021 11:29 AM    GFR est  08/16/2021 11:29 AM    GFR est non-AA 97 08/16/2021 11:29 AM    Calcium 9.5 08/16/2021 11:29 AM           ASSESSMENT      1. Bradycardia  2. CAD, native  3. Percutaneous transluminal   4. Dyslipidemia  5. Diabetes mellitus  6. Fatigue  7.  Dizziness         PLAN     PPM        Nicki Orosco MD  Cardiac Electrophysiology / Cardiology    [address]  [address], [address]  [phone] / [phone] Fax   [phone] / [phone] Fax

## 2021-08-20 NOTE — PROCEDURES
Successful implantation of a dual chamber pacemaker using conscious sedation.        Nicki Orosco MD

## 2021-08-24 ENCOUNTER — APPOINTMENT (OUTPATIENT)
Dept: CARDIAC REHAB | Age: 71
End: 2021-08-24
Payer: MEDICARE

## 2021-08-24 ENCOUNTER — TELEPHONE (OUTPATIENT)
Dept: CARDIAC REHAB | Age: 71
End: 2021-08-24

## 2021-08-24 NOTE — TELEPHONE ENCOUNTER
8/24/2021 [facility]: Mr. Carmen Looney called to cancel appointments for the next 2 weeks from having pacemaker placed.  His return appointment is set for 9/7/21 @ 200pm. Krystin Aguirre

## 2021-08-26 ENCOUNTER — APPOINTMENT (OUTPATIENT)
Dept: CARDIAC REHAB | Age: 71
End: 2021-08-26
Payer: MEDICARE

## 2021-08-27 ENCOUNTER — APPOINTMENT (OUTPATIENT)
Dept: CARDIAC REHAB | Age: 71
End: 2021-08-27
Payer: MEDICARE

## 2021-08-30 ENCOUNTER — OFFICE VISIT (OUTPATIENT)
Dept: NEUROLOGY | Age: 71
End: 2021-08-30
Payer: MEDICARE

## 2021-08-30 VITALS
BODY MASS INDEX: 25.22 KG/M2 | WEIGHT: 161 LBS | OXYGEN SATURATION: 99 % | SYSTOLIC BLOOD PRESSURE: 142 MMHG | DIASTOLIC BLOOD PRESSURE: 70 MMHG | TEMPERATURE: 97.5 F | HEART RATE: 82 BPM

## 2021-08-30 DIAGNOSIS — E11.43 DIABETIC AUTONOMIC NEUROPATHY ASSOCIATED WITH TYPE 2 DIABETES MELLITUS (HCC): Primary | ICD-10-CM

## 2021-08-30 DIAGNOSIS — G62.9 SMALL FIBER NEUROPATHY: ICD-10-CM

## 2021-08-30 PROCEDURE — 99204 OFFICE O/P NEW MOD 45 MIN: CPT | Performed by: PSYCHIATRY & NEUROLOGY

## 2021-08-30 NOTE — PROGRESS NOTES
NEUROLOGY NEW PATIENT OFFICE CONSULTATION      8/30/2021    RE: Severo Clamp         1950      REFERRED BY:  Luis Alberto Perez MD        CHIEF COMPLAINT:  This is Severo Clamp is a 80 y.o. male right handed retired who had concerns including Altered mental status (Dizziness, confusion, history of stroke, referred by Dr. Tami Burch ). HPI:     For the past 1 yr, patient noted numbness of both hands described as lack of sensation of all fingers. Denies numbness of feet. (-) weakness    In May 2021, patient started having episodes of being off balanced when walking lasting for few secs. Patient seeing Dr Tami Burch for mitral valve insufficiency, aortic valve stenosis referred for palpitations and shortness of breath with second degree heart block noted on previous holter monitoring (6/11/2021). He underwent PCI to the LAD on 6/29/2021. Monitor prior demonstrated episodes of bradycardia. Recently, patient had pacemaker placed. (+) diabetes for 15 yrs - uncontrolled in the past  (+) swelling of legs, R worse than L    Recent tests done:  ANS testing: (8/3/21):  1. Attenuated heart rate response to Valsalva maneuver, indicating a mild dysfunction of the cardiovagal (parasympathetic) division. Attenuation of late phase 2 and phase 4 BP response during Valsalva maneuver suggestive of an adrenergic (sympathetic) dysfunction. These findings can be seen in diabetic cardiac autonomic neuropathy.     2. Reduce sweat production in proximal leg, distal leg and foot which can be seen in small fiber polyneuropathy.     Blood test:  HgbA1c 6.7 (6/26/21)        ROS   (-) fever  (-) Rash  All other systems reviewed and are negative    Past Medical Hx  Past Medical History:   Diagnosis Date    Arrhythmia 06/16/2021    Holter monitor - 2 AVB type 1, 2:1 block, NSVT vs accel junctionalrare PACs, rare PVCs    CAD (coronary artery disease) 06/29/2021    stent    Diabetes ([facility] [address]     Hypertension     Ill-defined condition 2021 mitral regurgitation and aortic stenosis       Social Hx  Social History     Socioeconomic History    Marital status:      Spouse name: Not on file    Number of children: Not on file    Years of education: Not on file    Highest education level: Not on file   Tobacco Use    Smoking status: Former Smoker     Packs/day: 1.00     Years: 10.00     Pack years: 10.00     Quit date: 1999     Years since quittin.1    Smokeless tobacco: Never Used   Substance and Sexual Activity    Alcohol use: Not Currently   Other Topics Concern     Social Determinants of Health     Financial Resource Strain:     Difficulty of Paying Living Expenses:    Food Insecurity:     Worried About Running Out of Food in the Last Year:     [address] in the Last Year:    Transportation Needs:     Lack of Transportation (Medical):  Lack of Transportation (Non-Medical):    Physical Activity:     Days of Exercise per Week:     Minutes of Exercise per Session:    Stress:     Feeling of Stress :    Social Connections:     Frequency of Communication with Friends and Family:     Frequency of Social Gatherings with Friends and Family:     Attends Church Services:     Active Member of Clubs or Organizations:     Attends Club or Organization Meetings:     Marital Status:        Family Hx  Family History   Family history unknown: Yes       ALLERGIES  No Known Allergies    CURRENT MEDS  Current Outpatient Medications   Medication Sig Dispense Refill    amLODIPine (NORVASC) 5 mg tablet Take 5 mg by mouth daily.  aspirin delayed-release 81 mg tablet Take 81 mg by mouth daily.  atorvastatin (LIPITOR) 40 mg tablet Take 40 mg by mouth daily.  fenofibrate (LOFIBRA) 160 mg tablet Take 160 mg by mouth daily.  hydrALAZINE (APRESOLINE) 50 mg tablet Take 50 mg by mouth three (3) times daily.  metFORMIN (GLUCOPHAGE) 500 mg tablet Take 500 mg by mouth two (2) times daily (with meals).       potassium chloride SR (KLOR-CON 10) 10 mEq tablet Take  by mouth.  clopidogreL (Plavix) 75 mg tab Take 1 Tablet by mouth daily. Indications: blood clot prevention following percutaneous coronary intervention 30 Tablet 5           PREVIOUS WORKUP: (reviewed)  IMAGING:    CT Results (recent):  No results found for this or any previous visit. MRI Results (recent):  No results found for this or any previous visit. IR Results (recent):  No results found for this or any previous visit. VAS/US Results (recent):  No results found for this or any previous visit.           LABS (reviewed)  Results for orders placed or performed in visit on 08/16/21   URINALYSIS W/MICROSCOPIC   Result Value Ref Range    Specific Gravity 1.014 1.005 - 1.030    pH (UA) 6.0 5.0 - 7.5    Color Yellow Yellow    Appearance Clear Clear    Leukocyte Esterase Negative Negative    Protein Negative Negative/Trace    Glucose Negative Negative    Ketone Negative Negative    Blood Negative Negative    Bilirubin Negative Negative    Urobilinogen 0.2 0.2 - 1.0 mg/dL    Nitrites Negative Negative    Microscopic Examination Comment     Microscopic exam See additional order    MICROSCOPIC EXAMINATION   Result Value Ref Range    WBC None seen 0 - 5 /hpf    RBC None seen 0 - 2 /hpf    Epithelial cells None seen 0 - 10 /hpf    Casts None seen None seen /lpf    Bacteria None seen None seen/Few   CBC, NO DIFFERENTIAL/PLATELET   Result Value Ref Range    WBC 4.7 3.4 - 10.8 x10E3/uL    RBC 3.85 (L) 4.14 - 5.80 x10E6/uL    HGB 11.2 (L) 13.0 - 17.7 g/dL    HCT 34.5 (L) 37.5 - 51.0 %    MCV 90 79 - 97 fL    MCH 29.1 26.6 - 33.0 pg    MCHC 32.5 31.5 - 35.7 g/dL    RDW 12.6 11.6 - 15.6 %   METABOLIC PANEL, BASIC   Result Value Ref Range    Glucose 100 (H) 65 - 99 mg/dL    BUN 10 8 - 27 mg/dL    Creatinine 0.67 (L) 0.76 - 1.27 mg/dL    GFR est non-AA 97 >59 mL/min/1.73    GFR est  >59 mL/min/1.73    BUN/Creatinine ratio 15 10 - 24    Sodium 135 134 - 144 mmol/L    Potassium 4.2 3.5 - 5.2 mmol/L    Chloride 100 96 - 106 mmol/L    CO2 21 20 - 29 mmol/L    Calcium 9.5 8.6 - 10.2 mg/dL   PROTHROMBIN TIME + INR   Result Value Ref Range    INR 1.0 0.9 - 1.2    Prothrombin time 10.3 9.1 - 12.0 sec       Physical Exam:     Visit Vitals  BP (!) 142/70 (BP 1 Location: Right arm, BP Patient Position: Sitting, BP Cuff Size: Adult)   Pulse 82   Temp 97.5 °F (36.4 °C) (Temporal)   Wt 73 kg (161 lb)   SpO2 99%   BMI 25.22 kg/m²     General:  Alert, cooperative, no distress. Head:  Normocephalic, without obvious abnormality, atraumatic. Eyes:  Conjunctivae/corneas clear. Lungs:  Heart:   Non labored breathing  Regular rate and rhythm, no carotid bruits   Abdomen:   Soft, non-distended   Extremities: Extremities normal, atraumatic, no cyanosis or edema. Pulses: 2+ and symmetric all extremities. Skin: Skin color, texture, turgor normal. No rashes or lesions.   Neurologic Exam     Gen: Attention normal             Language: naming, repetition, fluency normal             Memory: intact recent and remote memory  Cranial Nerves:  I: smell Not tested   II: visual fields Full to confrontation   II: pupils Equal, round, reactive to light   II: optic disc No papilledema   III,VII: ptosis none   III,IV,VI: extraocular muscles  Full ROM   V: mastication normal   V: facial light touch sensation  normal   VII: facial muscle function   symmetric   VIII: hearing symmetric   IX: soft palate elevation  normal   XI: trapezius strength  5/5   XI: sternocleidomastoid strength 5/5   XI: neck flexion strength  5/5   XII: tongue  midline     Motor: normal bulk and tone, no tremor              Strength: 5/5 all four extremities  Sensory: intact to LT, PP, vibration, and JPS  Reflexes: 1+ throughout; Down going toes  Coordination: Good FTN and HTS  Gait: normal gait including tandem            Impression:     Pj Hawk is a 80 y.o. male who  has a past medical history of Arrhythmia (2021), CAD (coronary artery disease) (2021), Diabetes ([facility] [address] Hypertension, and Ill-defined condition (). who for the past 1 yr, noted numbness of both hands described as lack of sensation of all fingers. Denies numbness of feet. (-) weakness. In May 2021, patient started having episodes of being off balanced when walking lasting for few secs. (-) LOC. Patient seeing Dr Gabriel Calero for mitral valve insufficiency, aortic valve stenosis referred for palpitations and shortness of breath with second degree heart block noted on previous holter monitoring (2021). He underwent PCI to the LAD on 2021. Monitor prior demonstrated episodes of bradycardia. Recently, patient had pacemaker placed. ANS testin. Attenuated heart rate response to Valsalva maneuver, indicating a mild dysfunction of the cardiovagal (parasympathetic) division. Attenuation of late phase 2 and phase 4 BP response during Valsalva maneuver suggestive of an adrenergic (sympathetic) dysfunction. These findings can be seen in diabetic cardiac autonomic neuropathy. 2. Reduce sweat production in proximal leg, distal leg and foot which can be seen in small fiber polyneuropathy. RECOMMENDATIONS  1. I had a long discussion with patient and daugther. Discussed diagnosis, prognosis, pathophysiology and available treatment. Reviewed test results. All questions were answered. 2. Discussed with patient the pathophysiology of syncope/orthostatic intolerance and the need to avoid circumstances that can trigger symptoms (quickly standing from sitting position, eating heavy meals)  3. Optimize medical management of diabetes c/o PCP to prevent worsening  4.   Went over with patient a list of things to do to prevent an event ( Drink 2-2.5 L fluids/day, liberalize salt intake, avoid alcohol and large meals, perform lower extremity exercises, on bad days drink 500 cc water quickly, waist high elastic support stockings, physical counter maneuvers)  5. Regularly monitor BP specially during an event  6. Follow up with Dr Alejandrina Calderón regarding pacemaker      Follow-up and Dispositions    · Return if symptoms worsen or fail to improve.             Thank you for the consultation      Moy Martin MD  Diplomate, American Board of Psychiatry and Neurology  Diplomate, Neuromuscular Medicine  Diplomate, American Board of Electrodiagnostic Medicine        CC: Deepthi Daniels MD  Fax: [phone]

## 2021-08-30 NOTE — LETTER
8/30/2021    Patient: Damion Carlos   YOB: 1950   Date of Visit: 8/30/2021     MD Atul GrantMat[facility]  Via Fax: [place] [address] [facility] [address]  [address] [facility]  V[address] [facility] [address]    Dear MD Ankit Grant MD,      Thank you for referring Mr. Damion Carlos to [facility] for evaluation. My notes for this consultation are attached. If you have questions, please do not hesitate to call me. I look forward to following your patient along with you.       Sincerely,    Haroon Wright MD

## 2021-08-30 NOTE — PROGRESS NOTES
Chief Complaint   Patient presents with    Altered mental status     Dizziness, confusion, history of stroke, referred by Dr. Katina Juarez  BP (!) 142/70 (BP 1 Location: Right arm, BP Patient Position: Sitting, BP Cuff Size: Adult)   Pulse 82   Temp 97.5 °F (36.4 °C) (Temporal)   Wt 73 kg (161 lb)   SpO2 99%   BMI 25.22 kg/m²

## 2021-08-31 ENCOUNTER — APPOINTMENT (OUTPATIENT)
Dept: CARDIAC REHAB | Age: 71
End: 2021-08-31
Payer: MEDICARE

## 2021-09-01 ENCOUNTER — TELEPHONE (OUTPATIENT)
Dept: CARDIAC REHAB | Age: 71
End: 2021-09-01

## 2021-09-01 NOTE — TELEPHONE ENCOUNTER
9/1/2021 [facility]ness: I made a call to Dr. Rishi Florian office to make sure of clearance for Mr. Belen Lafleur to be able to return next week, 9/7/2021 for CRP. I left a voicemail message asking them to call or fax a clearance note of return after his follow up with Dr. Rishi Florian tomorrow, 9/2/2021.  Froylan Ansari

## 2021-09-02 ENCOUNTER — OFFICE VISIT (OUTPATIENT)
Dept: CARDIOLOGY CLINIC | Age: 71
End: 2021-09-02
Payer: MEDICARE

## 2021-09-02 ENCOUNTER — APPOINTMENT (OUTPATIENT)
Dept: CARDIAC REHAB | Age: 71
End: 2021-09-02
Payer: MEDICARE

## 2021-09-02 DIAGNOSIS — Z95.0 CARDIAC PACEMAKER IN SITU: Primary | ICD-10-CM

## 2021-09-02 PROCEDURE — 93280 PM DEVICE PROGR EVAL DUAL: CPT | Performed by: NURSE PRACTITIONER

## 2021-09-02 PROCEDURE — 93280 PM DEVICE PROGR EVAL DUAL: CPT | Performed by: INTERNAL MEDICINE

## 2021-09-02 NOTE — PROGRESS NOTES
c/ 2wk pacer ck/thresholds    Patient presents for wound check post-device implantation. The dressing was removed and the site was inspected. The site appeared to be well-healing without ecchymosis/tenderness/erythema. Denies pain, fevers, discharge.        Future Appointments   Date Time Provider Coy Rosario   9/7/2021  2:00  [facility]   9/7/2021  3:00  [facility]   9/9/2021  2:00  [facility]   9/10/2021  2:00  [facility]   9/14/2021  2:00  [facility]   9/14/2021  3:00  [facility]   9/16/2021  2:00  [facility]   9/17/2021  2:00  [facility]   9/21/2021  2:00  [facility]   9/21/2021  3:00  [facility]   9/23/2021  2:00  [facility]   9/24/2021  2:00 PM [address]   9/28/2021  2:00  [facility]   9/28/2021  3:00 PM [address] [facility]   9/30/2021  2:00 PM [address] [facility]   10/1/2021  2:00  [facility]   10/5/2021  2:00  [facility]   10/5/2021  3:00 PM [address] [facility]   10/7/2021  2:00 PM [address] [facility]   10/8/2021  2:00 PM [address] [facility]   10/12/2021  2:00  [facility]   10/12/2021  3:00 PM [address]   11/12/2021  3:00 PM PACEMAKER, [facility] AM[facility]   11/12/2021  3:20 PM Elder Rm MD [facility] BS AMB   2/22/2022  9:15 AM REMOTE1, [facility] BS AMB         Continue follow up in device clinic as planned.      See scanned documents

## 2021-09-03 ENCOUNTER — APPOINTMENT (OUTPATIENT)
Dept: CARDIAC REHAB | Age: 71
End: 2021-09-03
Payer: MEDICARE

## 2021-09-07 ENCOUNTER — HOSPITAL ENCOUNTER (OUTPATIENT)
Dept: CARDIAC REHAB | Age: 71
Discharge: HOME | End: 2021-09-07
Payer: MEDICARE

## 2021-09-07 VITALS — WEIGHT: 164.4 LBS | BODY MASS INDEX: 25.75 KG/M2

## 2021-09-07 PROCEDURE — 93797 PHYS/QHP OP CAR RHAB WO ECG: CPT

## 2021-09-07 PROCEDURE — 93798 PHYS/QHP OP CAR RHAB W/ECG: CPT

## 2021-09-09 ENCOUNTER — HOSPITAL ENCOUNTER (OUTPATIENT)
Dept: CARDIAC REHAB | Age: 71
Discharge: HOME | End: 2021-09-09
Payer: MEDICARE

## 2021-09-09 VITALS — BODY MASS INDEX: 25.44 KG/M2 | WEIGHT: 162.4 LBS

## 2021-09-09 PROCEDURE — 93798 PHYS/QHP OP CAR RHAB W/ECG: CPT

## 2021-09-10 ENCOUNTER — HOSPITAL ENCOUNTER (OUTPATIENT)
Dept: CARDIAC REHAB | Age: 71
Discharge: HOME | End: 2021-09-10
Payer: MEDICARE

## 2021-09-10 VITALS — BODY MASS INDEX: 25.34 KG/M2 | WEIGHT: 161.8 LBS

## 2021-09-10 PROCEDURE — 93798 PHYS/QHP OP CAR RHAB W/ECG: CPT

## 2021-09-14 ENCOUNTER — HOSPITAL ENCOUNTER (OUTPATIENT)
Dept: CARDIAC REHAB | Age: 71
Discharge: HOME | End: 2021-09-14
Payer: MEDICARE

## 2021-09-14 VITALS — WEIGHT: 163 LBS | BODY MASS INDEX: 25.53 KG/M2

## 2021-09-14 PROCEDURE — 93798 PHYS/QHP OP CAR RHAB W/ECG: CPT

## 2021-09-14 PROCEDURE — 93797 PHYS/QHP OP CAR RHAB WO ECG: CPT

## 2021-09-15 ENCOUNTER — TELEPHONE (OUTPATIENT)
Dept: CARDIOLOGY CLINIC | Age: 71
End: 2021-09-15

## 2021-09-15 NOTE — TELEPHONE ENCOUNTER
Returned patient's daughter's call, ID verified using two patient identifiers   Rescheduled appt from 9/22 to 9/16 at patient's request. All questions were answered.    Appreciative of call

## 2021-09-15 NOTE — TELEPHONE ENCOUNTER
ID verified using two patient identifiers   Scheduled f/u device check per MD request. Pacer spikes possibly in T-wave per cardiac rehab RN.

## 2021-09-15 NOTE — TELEPHONE ENCOUNTER
Patient's daughter would like a call back in reference to the call from the device clinic, please advise      [phone]

## 2021-09-16 ENCOUNTER — HOSPITAL ENCOUNTER (OUTPATIENT)
Dept: CARDIAC REHAB | Age: 71
Discharge: HOME | End: 2021-09-16
Payer: MEDICARE

## 2021-09-16 ENCOUNTER — OFFICE VISIT (OUTPATIENT)
Dept: CARDIOLOGY CLINIC | Age: 71
End: 2021-09-16

## 2021-09-16 VITALS — WEIGHT: 162.6 LBS | BODY MASS INDEX: 25.47 KG/M2

## 2021-09-16 DIAGNOSIS — Z95.0 CARDIAC PACEMAKER IN SITU: Primary | ICD-10-CM

## 2021-09-16 PROCEDURE — 93798 PHYS/QHP OP CAR RHAB W/ECG: CPT

## 2021-09-16 NOTE — PROGRESS NOTES
NC pacer ck/thresholds    All device and lead parameters are within normal limits. Device functioning appropriately as programmed.     See scanned documents

## 2021-09-17 ENCOUNTER — HOSPITAL ENCOUNTER (OUTPATIENT)
Dept: CARDIAC REHAB | Age: 71
Discharge: HOME | End: 2021-09-17
Payer: MEDICARE

## 2021-09-17 VITALS — BODY MASS INDEX: 25.62 KG/M2 | WEIGHT: 163.6 LBS

## 2021-09-17 PROCEDURE — 93798 PHYS/QHP OP CAR RHAB W/ECG: CPT

## 2021-09-21 ENCOUNTER — HOSPITAL ENCOUNTER (OUTPATIENT)
Dept: CARDIAC REHAB | Age: 71
Discharge: HOME | End: 2021-09-21
Payer: MEDICARE

## 2021-09-21 VITALS — BODY MASS INDEX: 25.69 KG/M2 | WEIGHT: 164 LBS

## 2021-09-21 PROCEDURE — 93797 PHYS/QHP OP CAR RHAB WO ECG: CPT

## 2021-09-21 PROCEDURE — 93798 PHYS/QHP OP CAR RHAB W/ECG: CPT

## 2021-09-23 ENCOUNTER — HOSPITAL ENCOUNTER (OUTPATIENT)
Dept: CARDIAC REHAB | Age: 71
Discharge: HOME | End: 2021-09-23
Payer: MEDICARE

## 2021-09-23 VITALS — BODY MASS INDEX: 25.83 KG/M2 | WEIGHT: 164.9 LBS

## 2021-09-23 PROCEDURE — 93798 PHYS/QHP OP CAR RHAB W/ECG: CPT

## 2021-09-24 ENCOUNTER — HOSPITAL ENCOUNTER (OUTPATIENT)
Dept: CARDIAC REHAB | Age: 71
Discharge: HOME | End: 2021-09-24
Payer: MEDICARE

## 2021-09-24 VITALS — BODY MASS INDEX: 25.7 KG/M2 | WEIGHT: 164.1 LBS

## 2021-09-24 PROCEDURE — 93798 PHYS/QHP OP CAR RHAB W/ECG: CPT

## 2021-09-28 ENCOUNTER — HOSPITAL ENCOUNTER (OUTPATIENT)
Dept: CARDIAC REHAB | Age: 71
Discharge: HOME | End: 2021-09-28
Payer: MEDICARE

## 2021-09-28 VITALS — WEIGHT: 160 LBS | BODY MASS INDEX: 25.06 KG/M2

## 2021-09-28 PROCEDURE — 93797 PHYS/QHP OP CAR RHAB WO ECG: CPT

## 2021-09-28 PROCEDURE — 93798 PHYS/QHP OP CAR RHAB W/ECG: CPT

## 2021-09-30 ENCOUNTER — HOSPITAL ENCOUNTER (OUTPATIENT)
Dept: CARDIAC REHAB | Age: 71
Discharge: HOME | End: 2021-09-30
Payer: MEDICARE

## 2021-09-30 ENCOUNTER — APPOINTMENT (OUTPATIENT)
Dept: CARDIAC REHAB | Age: 71
End: 2021-09-30
Payer: MEDICARE

## 2021-09-30 VITALS — WEIGHT: 160 LBS | BODY MASS INDEX: 25.06 KG/M2

## 2021-09-30 PROCEDURE — 93798 PHYS/QHP OP CAR RHAB W/ECG: CPT

## 2021-10-01 ENCOUNTER — APPOINTMENT (OUTPATIENT)
Dept: CARDIAC REHAB | Age: 71
End: 2021-10-01
Payer: MEDICARE

## 2021-10-01 ENCOUNTER — HOSPITAL ENCOUNTER (OUTPATIENT)
Dept: CARDIAC REHAB | Age: 71
Discharge: HOME | End: 2021-10-01
Payer: MEDICARE

## 2021-10-01 VITALS — WEIGHT: 159.9 LBS | BODY MASS INDEX: 25.04 KG/M2

## 2021-10-01 PROCEDURE — 93798 PHYS/QHP OP CAR RHAB W/ECG: CPT

## 2021-10-05 ENCOUNTER — HOSPITAL ENCOUNTER (OUTPATIENT)
Dept: CARDIAC REHAB | Age: 71
Discharge: HOME | End: 2021-10-05
Payer: MEDICARE

## 2021-10-05 ENCOUNTER — APPOINTMENT (OUTPATIENT)
Dept: CARDIAC REHAB | Age: 71
End: 2021-10-05
Payer: MEDICARE

## 2021-10-05 VITALS — WEIGHT: 160.6 LBS | BODY MASS INDEX: 25.15 KG/M2

## 2021-10-05 PROCEDURE — 93798 PHYS/QHP OP CAR RHAB W/ECG: CPT

## 2021-10-05 PROCEDURE — 93797 PHYS/QHP OP CAR RHAB WO ECG: CPT

## 2021-10-07 ENCOUNTER — APPOINTMENT (OUTPATIENT)
Dept: CARDIAC REHAB | Age: 71
End: 2021-10-07
Payer: MEDICARE

## 2021-10-07 ENCOUNTER — HOSPITAL ENCOUNTER (OUTPATIENT)
Dept: CARDIAC REHAB | Age: 71
Discharge: HOME | End: 2021-10-07
Payer: MEDICARE

## 2021-10-07 VITALS — WEIGHT: 159 LBS | BODY MASS INDEX: 24.9 KG/M2

## 2021-10-07 PROCEDURE — 93798 PHYS/QHP OP CAR RHAB W/ECG: CPT

## 2021-10-08 ENCOUNTER — HOSPITAL ENCOUNTER (OUTPATIENT)
Dept: CARDIAC REHAB | Age: 71
Discharge: HOME | End: 2021-10-08
Payer: MEDICARE

## 2021-10-08 VITALS — WEIGHT: 159 LBS | BODY MASS INDEX: 24.9 KG/M2

## 2021-10-08 PROCEDURE — 93798 PHYS/QHP OP CAR RHAB W/ECG: CPT

## 2021-10-12 ENCOUNTER — HOSPITAL ENCOUNTER (OUTPATIENT)
Dept: CARDIAC REHAB | Age: 71
Discharge: HOME | End: 2021-10-12
Payer: MEDICARE

## 2021-10-12 VITALS — BODY MASS INDEX: 25.03 KG/M2 | WEIGHT: 159.8 LBS

## 2021-10-12 PROCEDURE — 93798 PHYS/QHP OP CAR RHAB W/ECG: CPT

## 2021-10-12 PROCEDURE — 93797 PHYS/QHP OP CAR RHAB WO ECG: CPT

## 2021-10-12 NOTE — CARDIO/PULMONARY
Memory Piper  Completed phase II cardiac rehab and attended 36 sessions from 07/29/21 until 10/12/21. Oksana Saldaña is interested in maintaining optimal health and will work with Dr. Angeles Irby and Dr. Marcos Singer. Oksana Saldaña has improved his endurance and stamina through regular exercise, lost 2.2 lbs and 2.25 inches from his waist. Blood pressure is 138/62 and is WNL. He has also improved his nutrition and depression scores and these were reviewed with patient. Memory Piper plans to continue exercising at home, at a gym and has set revised goals that include cardio equipment, light weights and walking 3 to 5 times a week for at least 30 minutes.     [address], RN  10/12/2021

## 2021-11-12 ENCOUNTER — OFFICE VISIT (OUTPATIENT)
Dept: CARDIOLOGY CLINIC | Age: 71
End: 2021-11-12
Payer: MEDICARE

## 2021-11-12 VITALS
RESPIRATION RATE: 14 BRPM | HEIGHT: 67 IN | OXYGEN SATURATION: 99 % | HEART RATE: 76 BPM | BODY MASS INDEX: 24.96 KG/M2 | SYSTOLIC BLOOD PRESSURE: 122 MMHG | DIASTOLIC BLOOD PRESSURE: 58 MMHG | WEIGHT: 159 LBS

## 2021-11-12 DIAGNOSIS — Z95.0 CARDIAC PACEMAKER IN SITU: Primary | ICD-10-CM

## 2021-11-12 PROCEDURE — 93288 INTERROG EVL PM/LDLS PM IP: CPT | Performed by: NURSE PRACTITIONER

## 2021-11-12 PROCEDURE — 99215 OFFICE O/P EST HI 40 MIN: CPT | Performed by: INTERNAL MEDICINE

## 2021-11-12 PROCEDURE — G0463 HOSPITAL OUTPT CLINIC VISIT: HCPCS | Performed by: INTERNAL MEDICINE

## 2021-11-12 PROCEDURE — 93280 PM DEVICE PROGR EVAL DUAL: CPT | Performed by: NURSE PRACTITIONER

## 2021-11-12 RX ORDER — SPIRONOLACTONE 25 MG/1
25 TABLET ORAL DAILY
COMMUNITY
Start: 2021-09-23

## 2021-11-12 NOTE — PROGRESS NOTES
Room EP 5  Visit Vitals  BP (!) 122/58 (BP 1 Location: Left upper arm, BP Patient Position: Sitting)   Pulse 76   Resp 14   Ht 5' 7\" (1.702 m)   Wt 159 lb (72.1 kg)   SpO2 99%   BMI 24.90 kg/m²       Chest pain: yes, under left breast \"pinching\" sensation  Shortness of breath: no  Edema: no  Palpitations, Skipped beats, Rapid heartbeat: when BP low  Dizziness: no  Fatigue:no    New diagnosis/Surgeries: no    ER/Hospitalizations: no    Refills:no

## 2021-11-12 NOTE — PROGRESS NOTES
HISTORY OF PRESENTING ILLNESS      Delroy Mirza is a 80 y.o. male with HTN, hypercholesteremia, mitral valve insufficiency, aortic valve stenosis referred for palpitations and shortness of breath with second degree heart block noted on previous holter monitoring (2021). He underwent PCI to the LAD on 2021. Monitor prior demonstrated episodes of bradycardia. He has experienced dizziness, palpitations and paresthesias in his fingers/feet as well. He is currently wearing a monitor and has continued to experience symptoms. Monitor showed episodes of Wenckebach and possible 2:1 AV conduction. He reported palpitations during he was sinus rhythm in the 90s. Monitor was completed and showed continued 2:1 AV block during waking hours. He underwent dual chamber pacemaker implantation on 2021; site has healed well. Interrogation shows normal function. PAST MEDICAL HISTORY     Palpitations  Shortness of breath  Bradycardia  CAD, native  Percutaneous transluminal angioplasty       PAST SURGICAL HISTORY     History reviewed. No pertinent surgical history. ALLERGIES     No Known Allergies       FAMILY HISTORY     Family History   Family history unknown: Yes    negative for cardiac disease       SOCIAL HISTORY     Social History     Socioeconomic History    Marital status:    Tobacco Use    Smoking status: Former Smoker     Packs/day: 1.00     Years: 10.00     Pack years: 10.00     Quit date: 1999     Years since quittin.3    Smokeless tobacco: Never Used   Substance and Sexual Activity    Alcohol use: Not Currently         MEDICATIONS     Current Outpatient Medications   Medication Sig    spironolactone (ALDACTONE) 25 mg tablet Take 25 mg by mouth daily.  aspirin delayed-release 81 mg tablet Take 81 mg by mouth daily.  atorvastatin (LIPITOR) 40 mg tablet Take 40 mg by mouth nightly.  fenofibrate (LOFIBRA) 160 mg tablet Take 160 mg by mouth daily.     hydrALAZINE (APRESOLINE) 50 mg tablet Take 50 mg by mouth three (3) times daily.  metFORMIN (GLUCOPHAGE) 500 mg tablet Take 500 mg by mouth two (2) times daily (with meals).  potassium chloride SR (KLOR-CON 10) 10 mEq tablet Take  by mouth daily.  clopidogreL (Plavix) 75 mg tab Take 1 Tablet by mouth daily. Indications: blood clot prevention following percutaneous coronary intervention     No current facility-administered medications for this visit. I have reviewed the nurses notes, vitals, problem list, allergy list, medical history, family, social history and medications. REVIEW OF SYMPTOMS      General: Pt denies excessive weight gain or loss. Pt is able to conduct ADL's  HEENT: Denies blurred vision, headaches, hearing loss, epistaxis and difficulty swallowing. Respiratory: Denies cough, congestion, shortness of breath, HAIRSTON, wheezing or stridor. Cardiovascular: Denies precordial pain, palpitations, edema or PND  Gastrointestinal: Denies poor appetite, indigestion, abdominal pain or blood in stool  Genitourinary: Denies hematuria, dysuria, increased urinary frequency  Musculoskeletal: Denies joint pain or swelling from muscles or joints  Neurologic: Denies tremor, paresthesias, headache, or sensory motor disturbance  Psychiatric: Denies confusion, insomnia, depression  Integumentray: Denies rash, itching or ulcers. Hematologic: Denies easy bruising, bleeding       PHYSICAL EXAMINATION      Vitals: see vitals section  General: Well developed, in no acute distress. HEENT: No jaundice, oral mucosa moist, no oral ulcers  Neck: Supple, no stiffness, no lymphadenopathy, supple  Heart:  Normal S1/S2 negative S3 or S4. Regular, no murmur, gallop or rub, no jugular venous distention  Respiratory: Clear bilaterally x 4, no wheezing or rales  Abdomen:   Soft, non-tender, bowel sounds are active. Extremities:  No edema, normal cap refill, no cyanosis.   Musculoskeletal: No clubbing, no deformities  Neuro: A&Ox3, speech clear, gait stable, cooperative, no focal neurologic deficits  Skin: Skin color is normal. No rashes or lesions. Non diaphoretic, moist.  Vascular: 2+ pulses symmetric in all extremities       DIAGNOSTIC DATA      EKG:   Visit Vitals  BP (!) 122/58 (BP 1 Location: Left upper arm, BP Patient Position: Sitting)   Pulse 76   Resp 14   Ht 5' 7\" (1.702 m)   Wt 159 lb (72.1 kg)   SpO2 99%   BMI 24.90 kg/m²        LABORATORY DATA      Lab Results   Component Value Date/Time    WBC 4.7 08/16/2021 11:29 AM    HGB 11.2 (L) 08/16/2021 11:29 AM    HCT 34.5 (L) 08/16/2021 11:29 AM    MCV 90 08/16/2021 11:29 AM      Lab Results   Component Value Date/Time    Sodium 135 08/16/2021 11:29 AM    Potassium 4.2 08/16/2021 11:29 AM    Chloride 100 08/16/2021 11:29 AM    CO2 21 08/16/2021 11:29 AM    Anion gap 8 06/29/2021 11:31 AM    Glucose 100 (H) 08/16/2021 11:29 AM    BUN 10 08/16/2021 11:29 AM    Creatinine 0.67 (L) 08/16/2021 11:29 AM    BUN/Creatinine ratio 15 08/16/2021 11:29 AM    GFR est  08/16/2021 11:29 AM    GFR est non-AA 97 08/16/2021 11:29 AM    Calcium 9.5 08/16/2021 11:29 AM           ASSESSMENT      1. Bradycardia  2. CAD, native  3. Percutaneous transluminal   4. Dyslipidemia  5. Diabetes mellitus  6. Fatigue  7. Dizziness         PLAN     Continue monitoring in device clinic       FOLLOW-UP     1 year    Thank you, Isis Nelson MD and Dr. Kyung Rodriguez for allowing me to participate in the care of this extraordinarily pleasant male. Please do not hesitate to contact me for further questions/concerns.          Sindhu Sampson MD  Cardiac Electrophysiology / Cardiology    [address]  [address], Suite [facility], [address] [place] [address]  [phone] / [phone] Fax   [phone] / [phone] Fax

## 2021-11-12 NOTE — LETTER
11/12/2021    Patient: Andi Estrella   YOB: 1950   Date of Visit: 11/12/2021     Stephan Urrutia MD  [facility] [phone] [id number]  Via Fax: [phone]    Dear Stephan Urrutia MD,      Thank you for referring Mr. Andi Estrella to [address] for evaluation. My notes for this consultation are attached. If you have questions, please do not hesitate to call me. I look forward to following your patient along with you.       Sincerely,    Cayla Salazar MD

## 2022-01-26 ENCOUNTER — TELEPHONE (OUTPATIENT)
Dept: CARDIOLOGY CLINIC | Age: 72
End: 2022-01-26

## 2022-01-26 NOTE — TELEPHONE ENCOUNTER
Returned patient's daughter's call, ID verified using two patient identifiers   She states that he fell out of the dining room chair and would like to make sure his pacemaker is functioning appropriately. Advised that no alerts have been received on [employer] but to send a manual transmission once he returns home. Advised to let call center know when transmission is sent and to call [employer] if she has difficulty with sending. Will call back with results.

## 2022-01-26 NOTE — TELEPHONE ENCOUNTER
Patient's daughter called stated the patient fell a few days ago, inquiring whether transmission are being received.           RTKPT:[phone] or [phone]

## 2022-01-27 ENCOUNTER — TELEPHONE (OUTPATIENT)
Dept: CARDIOLOGY CLINIC | Age: 72
End: 2022-01-27

## 2022-01-27 NOTE — TELEPHONE ENCOUNTER
Patient called to give an update to the device clinic and to let them know she sent a manual transmission, please advise        [phone]

## 2022-01-27 NOTE — TELEPHONE ENCOUNTER
Patient called and stated she had a missed call and not sure if it was from the device clinic, please advise        [phone]

## 2022-01-27 NOTE — TELEPHONE ENCOUNTER
Returned call, spoke with daughter, Raquel Jya verified using two patient identifiers   Advised that remote transmission was received and device appears to be functioning appropriately but I would like to be sure and get more information by checking the leads in the clinic. Agreeable to come in tomorrow at 1020am. Will call  Daughter afterwards with findings.

## 2022-01-28 ENCOUNTER — OFFICE VISIT (OUTPATIENT)
Dept: CARDIOLOGY CLINIC | Age: 72
End: 2022-01-28
Payer: MEDICARE

## 2022-01-28 DIAGNOSIS — Z95.0 CARDIAC PACEMAKER IN SITU: Primary | ICD-10-CM

## 2022-01-28 NOTE — PROGRESS NOTES
NC pacer ck/ s/p fall    Device functioning appropriately as programmed, lead trends stable.  F/u as scheduled    See scanned documents

## 2022-02-03 PROCEDURE — 93280 PM DEVICE PROGR EVAL DUAL: CPT | Performed by: INTERNAL MEDICINE

## 2022-02-21 PROCEDURE — 93296 REM INTERROG EVL PM/IDS: CPT | Performed by: INTERNAL MEDICINE

## 2022-02-21 PROCEDURE — 93294 REM INTERROG EVL PM/LDLS PM: CPT | Performed by: INTERNAL MEDICINE

## 2022-02-22 ENCOUNTER — OFFICE VISIT (OUTPATIENT)
Dept: CARDIOLOGY CLINIC | Age: 72
End: 2022-02-22
Payer: MEDICARE

## 2022-02-22 DIAGNOSIS — Z95.0 CARDIAC PACEMAKER IN SITU: Primary | ICD-10-CM

## 2022-05-26 ENCOUNTER — OFFICE VISIT (OUTPATIENT)
Dept: CARDIOLOGY CLINIC | Age: 72
End: 2022-05-26
Payer: MEDICARE

## 2022-05-26 DIAGNOSIS — Z95.0 CARDIAC PACEMAKER IN SITU: Primary | ICD-10-CM

## 2022-05-26 PROCEDURE — 93296 REM INTERROG EVL PM/IDS: CPT | Performed by: INTERNAL MEDICINE

## 2022-05-26 PROCEDURE — 93294 REM INTERROG EVL PM/LDLS PM: CPT | Performed by: INTERNAL MEDICINE

## 2022-05-26 NOTE — LETTER
5/27/2022 10:17 AM    Mr. Bebo Rascon  [address] [address]      Dear Mr. Bebo Rascon,    We have received your recent remote monitor check of your implanted device on 5/26/22. Your remaining estimated battery life is 10.7 years and your device is working normally & appropriately. Your next remote monitor check is scheduled for 9/1/22. This is NOT an in-clinic appointment. This transmission is sent from your home monitor. Please make sure your home monitor is plugged into power and within 10 feet of where you sleep. If you are using the phone applications, please make sure it is open on your smart phone. If you have difficulty sending a transmission, please do NOT call our office. Instead, call tech support for your device as they are better able to assist.    [employer] ([employer])   [phone]    If you have any questions, please call the Pacemaker/ICD clinic at the [address] [place] [address] location at [phone]. We appreciate you staying remotely connected!     Sincerely,    Alysia Garay RN, BSN  Device Coordinator  [phone]

## 2022-05-27 NOTE — PROGRESS NOTES
chargeable dc pm remote    Normal device function with 64.8% RV pacing. No events recorded. See scanned report in  for details.

## 2022-08-31 PROCEDURE — 93294 REM INTERROG EVL PM/LDLS PM: CPT | Performed by: INTERNAL MEDICINE

## 2022-08-31 PROCEDURE — 93296 REM INTERROG EVL PM/IDS: CPT | Performed by: INTERNAL MEDICINE

## 2022-09-01 ENCOUNTER — OFFICE VISIT (OUTPATIENT)
Dept: CARDIOLOGY CLINIC | Age: 72
End: 2022-09-01
Payer: MEDICARE

## 2022-09-01 DIAGNOSIS — Z95.0 CARDIAC PACEMAKER IN SITU: Primary | ICD-10-CM

## 2022-09-16 ENCOUNTER — TELEPHONE (OUTPATIENT)
Dept: CARDIOLOGY CLINIC | Age: 72
End: 2022-09-16

## 2022-09-16 NOTE — TELEPHONE ENCOUNTER
Spoke with patient's daughter, ID verified using two patient identifiers   States pt received call about upcoming appt in November, advised that this is his scheduled annual in-clinic device check and appt with the Nurse Practitioner. Verbalizes understanding.

## 2022-11-03 PROBLEM — Z95.0 PACEMAKER: Status: ACTIVE | Noted: 2022-11-03

## 2022-11-03 PROBLEM — I44.30 AV BLOCK: Status: ACTIVE | Noted: 2022-11-03

## 2022-11-03 PROBLEM — I25.10 CAD (CORONARY ARTERY DISEASE): Status: ACTIVE | Noted: 2021-06-29

## 2022-11-03 NOTE — PROGRESS NOTES
Cardiac Electrophysiology Office Follow-up    NAME:  Mackenzie Ellis   :  1950  MRM:  [id number]    Date:  2022     Primary Cardiologist: Dr. Marisol Patiño and Plan:     1. Pacemaker  -     AMB POC EKG ROUTINE W/ 12 LEADS, INTER & REP  2. AV block  -     AMB POC EKG ROUTINE W/ 12 LEADS, INTER & REP  3. Coronary artery disease involving native coronary artery of native heart without angina pectoris  -     AMB POC EKG ROUTINE W/ 12 LEADS, INTER & REP  4. Primary hypertension  -     AMB POC EKG ROUTINE W/ 12 LEADS, INTER & REP     Device Interrogation    Device: Medtronic dual chamber pacemaker  Battery/Longevity: 11.1 years  Programmed Mode: DDDR     RA lead    Intrinsic amplitude: 4.0 mV    Pacing impedance: 570 ohms    Pacing threshold: 0.75V @ 0.4ms    % Pacin.9  RV lead    Intrinsic amplitude:  7.0 mV    Pacing impedance:  513 ohms    Pacing threshold: 1.25V @ 0.4ms    %pacin.1    No sustained VT/VF or therapies  No AF/AT  Optivol fluid index indicates no evidence for CHF    Programming changes:  Iterative programming was performed to test the leads sensing and threshold parameters without any permanent changes. CAD.  - S/p PCI in 2021.   - Continue aspirin, Plavix, and statin. HTN.   - Controlled on hydralazine and spironolactone. Continue current medications. Continue to follow-up with Dr. Kassandra Murray. Continue remote transmissions Q3 months. Follow-up in the EP clinic in one year, or sooner for any concerns. Subjective:     Mackenzie Ellis, a 71y.o. year-old who presents for followup of pacemaker. He has PMH of HTN, hypercholesteremia, mitral valve insufficiency, aortic valve stenosis, CAD with PCI to the LAD on 2021. Prior monitor showed 2:1 AV block during waking hours. He underwent dual chamber pacemaker implantation on 2021. He is feeling very well and denies chest pain, palpitations, dyspnea, dizziness or syncope.  He walks 2-3 miles per day most days of the week without any exertional symptoms or change in exercise tolerance. He follows with Dr. Kale Andrews annually. Review of Systems:   12 point review of systems was performed. All negative except for HPI    Exam:     Physical Exam:  BP (!) 136/54 (BP 1 Location: Left upper arm, BP Patient Position: Sitting, BP Cuff Size: Large adult)   Pulse 82   Resp 16   Ht 5' 7\" (1.702 m)   Wt 167 lb (75.8 kg)   SpO2 98%   BMI 26.16 kg/m²     PHYSICAL EXAM  General:  Alert and oriented, in no acute distress  Head:  Atraumatic, normocephalic  Eyes:  extraocular muscles intact  Neck:  Supple, normal range of motion  Lungs:  Clear to auscultation bilaterally, no wheezes/rales/rhonchi   Cardiovascular:  Regular rate and rhythm, normal A1-A9, 2/6 systolic murmur  Abdomen:  Soft, nontender, nondistended, normoactive bowel sounds  Skin:  Intact, no rash  Extremities:, no clubbing, cyanosis, or edema; forrest compression stockings  Musculoskeletal: normal range of motion  Neurological:  Alert and oriented, no focal neurologic deficits  Psychiatric:  Normal mood and affect      Medications:     Current Outpatient Medications   Medication Sig    multivitamin (ONE A DAY) tablet Take 1 Tablet by mouth daily. aspirin delayed-release 81 mg tablet Take 81 mg by mouth daily. atorvastatin (LIPITOR) 40 mg tablet Take 40 mg by mouth nightly. fenofibrate (LOFIBRA) 160 mg tablet Take 160 mg by mouth daily. metFORMIN (GLUCOPHAGE) 500 mg tablet Take 500 mg by mouth two (2) times daily (with meals). clopidogreL (Plavix) 75 mg tab Take 1 Tablet by mouth daily. Indications: blood clot prevention following percutaneous coronary intervention    spironolactone (ALDACTONE) 25 mg tablet Take 25 mg by mouth daily. hydrALAZINE (APRESOLINE) 50 mg tablet Take 50 mg by mouth three (3) times daily. potassium chloride SR (KLOR-CON 10) 10 mEq tablet Take  by mouth daily.  (Patient not taking: Reported on 11/18/2022)     No current facility-administered medications for this visit. Diagnostic Data Review:       Results for orders placed or performed during the hospital encounter of 06/29/21   EKG, 12 LEAD, INITIAL   Result Value Ref Range    Ventricular Rate 72 BPM    Atrial Rate 72 BPM    P-R Interval 242 ms    QRS Duration 146 ms    Q-T Interval 462 ms    QTC Calculation (Bezet) 505 ms    Calculated P Axis 37 degrees    Calculated R Axis 90 degrees    Calculated T Axis -7 degrees    Diagnosis       Sinus rhythm with 1st degree AV block  Right bundle branch block  T wave abnormality, consider inferior ischemia  Abnormal ECG  No previous ECGs available  Confirmed by Albert Moore ([id number]) on 6/30/2021 7:31:46 PM        8/20/21: implantation of a Medtronic dual chamber pacemaker per Dr. Beth Anand             ___________________________________________________    Dorys Eason NP    Cardiac Electrophysiology  [address] [place] [address] and [facility] [place]  [address], [address] [facility].  [facility] [address] [facility], [address] [place] [address] [phone]

## 2022-11-18 ENCOUNTER — OFFICE VISIT (OUTPATIENT)
Dept: CARDIOLOGY CLINIC | Age: 72
End: 2022-11-18

## 2022-11-18 ENCOUNTER — OFFICE VISIT (OUTPATIENT)
Dept: CARDIOLOGY CLINIC | Age: 72
End: 2022-11-18
Payer: MEDICARE

## 2022-11-18 VITALS
HEIGHT: 67 IN | DIASTOLIC BLOOD PRESSURE: 54 MMHG | BODY MASS INDEX: 26.21 KG/M2 | OXYGEN SATURATION: 98 % | RESPIRATION RATE: 16 BRPM | WEIGHT: 167 LBS | HEART RATE: 82 BPM | SYSTOLIC BLOOD PRESSURE: 136 MMHG

## 2022-11-18 DIAGNOSIS — Z95.0 PACEMAKER: Primary | ICD-10-CM

## 2022-11-18 DIAGNOSIS — Z95.0 CARDIAC PACEMAKER IN SITU: Primary | ICD-10-CM

## 2022-11-18 DIAGNOSIS — I10 PRIMARY HYPERTENSION: ICD-10-CM

## 2022-11-18 DIAGNOSIS — I25.10 CORONARY ARTERY DISEASE INVOLVING NATIVE CORONARY ARTERY OF NATIVE HEART WITHOUT ANGINA PECTORIS: ICD-10-CM

## 2022-11-18 DIAGNOSIS — I44.30 AV BLOCK: ICD-10-CM

## 2022-11-18 PROCEDURE — 3078F DIAST BP <80 MM HG: CPT | Performed by: NURSE PRACTITIONER

## 2022-11-18 PROCEDURE — 93280 PM DEVICE PROGR EVAL DUAL: CPT | Performed by: INTERNAL MEDICINE

## 2022-11-18 PROCEDURE — 1123F ACP DISCUSS/DSCN MKR DOCD: CPT | Performed by: NURSE PRACTITIONER

## 2022-11-18 PROCEDURE — 93000 ELECTROCARDIOGRAM COMPLETE: CPT | Performed by: NURSE PRACTITIONER

## 2022-11-18 PROCEDURE — 99214 OFFICE O/P EST MOD 30 MIN: CPT | Performed by: NURSE PRACTITIONER

## 2022-11-18 PROCEDURE — 3074F SYST BP LT 130 MM HG: CPT | Performed by: NURSE PRACTITIONER

## 2022-11-18 RX ORDER — BISMUTH SUBSALICYLATE 262 MG
1 TABLET,CHEWABLE ORAL DAILY
COMMUNITY

## 2022-11-18 NOTE — PROGRESS NOTES
Room #: 3    No complaints today. Walks 2-3 miles 5 days per week. Chief Complaint   Patient presents with    Annual Exam    Pacemaker Check    Slow Heart Rate    Fatigue       Visit Vitals  BP (!) 136/54 (BP 1 Location: Left upper arm, BP Patient Position: Sitting, BP Cuff Size: Large adult)   Pulse 82   Resp 16   Ht 5' 7\" (1.702 m)   Wt 167 lb (75.8 kg)   SpO2 98%   BMI 26.16 kg/m²         Chest pain:  NO  Shortness of breath:  NO  Edema: NO  Palpitations, skipped beats, rapid heartbeat:  NO  Dizziness:  NO    1. Have you been to the ER, urgent care clinic since your last visit? Hospitalized since your last visit? NO    2. Have you seen or consulted any other health care providers outside of the [address] [place] [address] since your last visit? Include any pap smears or colon screening.  NO      Refills:  NO

## 2023-02-21 ENCOUNTER — OFFICE VISIT (OUTPATIENT)
Dept: CARDIOLOGY CLINIC | Age: 73
End: 2023-02-21
Payer: MEDICARE

## 2023-02-21 DIAGNOSIS — Z95.0 CARDIAC PACEMAKER IN SITU: Primary | ICD-10-CM

## 2023-05-22 RX ORDER — ATORVASTATIN CALCIUM 40 MG/1
40 TABLET, FILM COATED ORAL NIGHTLY
COMMUNITY

## 2023-05-22 RX ORDER — SPIRONOLACTONE 25 MG/1
25 TABLET ORAL DAILY
COMMUNITY
Start: 2021-09-23

## 2023-05-22 RX ORDER — FENOFIBRATE 160 MG/1
160 TABLET ORAL DAILY
COMMUNITY

## 2023-05-22 RX ORDER — HYDRALAZINE HYDROCHLORIDE 50 MG/1
50 TABLET, FILM COATED ORAL 3 TIMES DAILY
COMMUNITY

## 2023-05-22 RX ORDER — ASPIRIN 81 MG/1
81 TABLET ORAL DAILY
COMMUNITY

## 2023-05-22 RX ORDER — CLOPIDOGREL BISULFATE 75 MG/1
75 TABLET ORAL DAILY
COMMUNITY
Start: 2021-06-29

## 2023-05-23 ENCOUNTER — NURSE ONLY (OUTPATIENT)
Age: 73
End: 2023-05-23
Payer: MEDICARE

## 2023-05-23 DIAGNOSIS — Z95.0 PRESENCE OF CARDIAC PACEMAKER: ICD-10-CM

## 2023-05-23 DIAGNOSIS — Z95.810 PRESENCE OF BIVENTRICULAR AUTOMATIC CARDIOVERTER/DEFIBRILLATOR (AICD): Primary | ICD-10-CM

## 2023-05-23 PROCEDURE — 93296 REM INTERROG EVL PM/IDS: CPT | Performed by: INTERNAL MEDICINE

## 2023-11-08 ENCOUNTER — TELEPHONE (OUTPATIENT)
Age: 73
End: 2023-11-08

## 2023-11-30 ENCOUNTER — CLINICAL DOCUMENTATION (OUTPATIENT)
Age: 73
End: 2023-11-30

## 2023-11-30 NOTE — PROGRESS NOTES
Received fax from [facility] for device management during endo. Per Shalini Kapoor, NP can use magnet. Faxed to [facility] at fax # [phone]. Received confirmation.

## 2024-01-30 ENCOUNTER — OFFICE VISIT (OUTPATIENT)
Age: 74
End: 2024-01-30
Payer: MEDICARE

## 2024-01-30 VITALS
HEART RATE: 78 BPM | SYSTOLIC BLOOD PRESSURE: 154 MMHG | BODY MASS INDEX: 26.21 KG/M2 | DIASTOLIC BLOOD PRESSURE: 68 MMHG | HEIGHT: 67 IN | OXYGEN SATURATION: 98 % | WEIGHT: 167 LBS

## 2024-01-30 DIAGNOSIS — I25.10 CORONARY ARTERY DISEASE INVOLVING NATIVE CORONARY ARTERY OF NATIVE HEART WITHOUT ANGINA PECTORIS: ICD-10-CM

## 2024-01-30 DIAGNOSIS — Z95.0 PACEMAKER: ICD-10-CM

## 2024-01-30 DIAGNOSIS — I10 PRIMARY HYPERTENSION: ICD-10-CM

## 2024-01-30 DIAGNOSIS — I44.30 AV BLOCK: Primary | ICD-10-CM

## 2024-01-30 PROCEDURE — 3017F COLORECTAL CA SCREEN DOC REV: CPT | Performed by: INTERNAL MEDICINE

## 2024-01-30 PROCEDURE — 1123F ACP DISCUSS/DSCN MKR DOCD: CPT | Performed by: INTERNAL MEDICINE

## 2024-01-30 PROCEDURE — 3078F DIAST BP <80 MM HG: CPT | Performed by: INTERNAL MEDICINE

## 2024-01-30 PROCEDURE — 99214 OFFICE O/P EST MOD 30 MIN: CPT | Performed by: INTERNAL MEDICINE

## 2024-01-30 PROCEDURE — 1036F TOBACCO NON-USER: CPT | Performed by: INTERNAL MEDICINE

## 2024-01-30 PROCEDURE — 93010 ELECTROCARDIOGRAM REPORT: CPT | Performed by: INTERNAL MEDICINE

## 2024-01-30 PROCEDURE — G8427 DOCREV CUR MEDS BY ELIG CLIN: HCPCS | Performed by: INTERNAL MEDICINE

## 2024-01-30 PROCEDURE — G8419 CALC BMI OUT NRM PARAM NOF/U: HCPCS | Performed by: INTERNAL MEDICINE

## 2024-01-30 PROCEDURE — 93005 ELECTROCARDIOGRAM TRACING: CPT | Performed by: INTERNAL MEDICINE

## 2024-01-30 PROCEDURE — G8484 FLU IMMUNIZE NO ADMIN: HCPCS | Performed by: INTERNAL MEDICINE

## 2024-01-30 PROCEDURE — 3077F SYST BP >= 140 MM HG: CPT | Performed by: INTERNAL MEDICINE

## 2024-01-30 ASSESSMENT — PATIENT HEALTH QUESTIONNAIRE - PHQ9
1. LITTLE INTEREST OR PLEASURE IN DOING THINGS: 0
SUM OF ALL RESPONSES TO PHQ QUESTIONS 1-9: 0
SUM OF ALL RESPONSES TO PHQ9 QUESTIONS 1 & 2: 0
SUM OF ALL RESPONSES TO PHQ QUESTIONS 1-9: 0
2. FEELING DOWN, DEPRESSED OR HOPELESS: 0

## 2024-01-30 NOTE — PROGRESS NOTES
reviewed as well as pertinent prior admission documents.      Review of Systems:   12 point review of systems was performed. All negative except for HPI    Exam:     Physical Exam:  BP (!) 154/68 (Site: Left Upper Arm, Position: Sitting, Cuff Size: Medium Adult)   Pulse 78   Ht 1.702 m (5' 7\")   Wt 75.8 kg (167 lb)   SpO2 98%   BMI 26.16 kg/m²      PHYSICAL EXAM  General:  Alert and oriented, in no acute distress  Head:  Atraumatic, normocephalic  Eyes:  extraocular muscles intact  Neck:  Supple, normal range of motion  Lungs:  Clear to auscultation bilaterally, no wheezes/rales/rhonchi   Cardiovascular:  Regular rate and rhythm, normal S1-S2, no murmurs/rubs/gallops  Abdomen:  Soft, nontender, nondistended, normoactive bowel sounds  Skin:  Intact, no rash; left pectoral site well healed  Extremities:, no clubbing, cyanosis, or edema  Musculoskeletal: normal range of motion  Neurological:  Alert and oriented, no focal neurologic deficits  Psychiatric:  Normal mood and affect    EKG: sinus,  72 bpm, Qtc 466 ms      Medications:     Current Outpatient Medications   Medication Sig    aspirin 81 MG EC tablet Take 1 tablet by mouth daily    atorvastatin (LIPITOR) 40 MG tablet Take 1 tablet by mouth nightly    fenofibrate (TRIGLIDE) 160 MG tablet Take 1 tablet by mouth daily    hydrALAZINE (APRESOLINE) 50 MG tablet Take 1 tablet by mouth 3 times daily    metFORMIN (GLUCOPHAGE) 500 MG tablet Take 1 tablet by mouth 2 times daily (with meals)    spironolactone (ALDACTONE) 25 MG tablet Take 0.5 tablets by mouth daily    clopidogrel (PLAVIX) 75 MG tablet Take 1 tablet by mouth daily (Patient not taking: Reported on 1/30/2024)     No current facility-administered medications for this visit.      Diagnostic Data Review:     No results found for this or any previous visit.    No results found for this or any previous visit.    No results found for this or any previous visit.    [unfilled]   No specialty comments

## 2024-08-01 PROCEDURE — 93294 REM INTERROG EVL PM/LDLS PM: CPT | Performed by: INTERNAL MEDICINE

## 2024-12-09 ENCOUNTER — TELEPHONE (OUTPATIENT)
Age: 74
End: 2024-12-09

## 2024-12-09 NOTE — TELEPHONE ENCOUNTER
Returned call, ID verified using two patient identifiers   Pt will be out of the country from January until the end of February. Advised that pt does not need to take the home monitor as it does not function internationally. Educated that implanted device will continue to record data and will upload upon return/reconnection with     Opportunities for questions, clarifications, and concerns provided.  Pt expressed understanding.

## 2024-12-09 NOTE — TELEPHONE ENCOUNTER
Patient's daughter is calling in regards to patient is getting ready to leave the country and she has questions about patients device. She would like a call back ASAP to discuss further. (Patients daughter is on PHI form)     Patients daughter Gely phone # [phone]

## 2025-03-03 ENCOUNTER — TELEPHONE (OUTPATIENT)
Age: 75
End: 2025-03-03

## 2025-03-03 NOTE — TELEPHONE ENCOUNTER
Received call from call center. ID verified using two patient identifiers with pts daughter. Daughter states her father will be extending his stay in [place] and was wondering about taking home transmitter next visit. Reached out to [employer] rep to see if connectivity is possible in that area and will report back to pt and/or his daughter with provided information.    Opportunities for questions, clarifications, and concerns provided.  Pt expressed understanding.

## 2025-06-18 ENCOUNTER — OFFICE VISIT (OUTPATIENT)
Age: 75
End: 2025-06-18
Payer: MEDICARE

## 2025-06-18 ENCOUNTER — PROCEDURE VISIT (OUTPATIENT)
Age: 75
End: 2025-06-18
Payer: MEDICARE

## 2025-06-18 VITALS
SYSTOLIC BLOOD PRESSURE: 132 MMHG | HEIGHT: 67 IN | WEIGHT: 152.8 LBS | BODY MASS INDEX: 23.98 KG/M2 | HEART RATE: 68 BPM | OXYGEN SATURATION: 98 % | DIASTOLIC BLOOD PRESSURE: 52 MMHG

## 2025-06-18 DIAGNOSIS — Z95.0 PACEMAKER: Primary | ICD-10-CM

## 2025-06-18 DIAGNOSIS — I25.10 CORONARY ARTERY DISEASE INVOLVING NATIVE CORONARY ARTERY OF NATIVE HEART WITHOUT ANGINA PECTORIS: ICD-10-CM

## 2025-06-18 DIAGNOSIS — Z95.0 PACEMAKER: ICD-10-CM

## 2025-06-18 DIAGNOSIS — I10 PRIMARY HYPERTENSION: ICD-10-CM

## 2025-06-18 DIAGNOSIS — I44.30 AV BLOCK: Primary | ICD-10-CM

## 2025-06-18 PROCEDURE — 93005 ELECTROCARDIOGRAM TRACING: CPT

## 2025-06-18 PROCEDURE — 1160F RVW MEDS BY RX/DR IN RCRD: CPT

## 2025-06-18 PROCEDURE — 99214 OFFICE O/P EST MOD 30 MIN: CPT

## 2025-06-18 PROCEDURE — 93280 PM DEVICE PROGR EVAL DUAL: CPT | Performed by: INTERNAL MEDICINE

## 2025-06-18 PROCEDURE — 3078F DIAST BP <80 MM HG: CPT

## 2025-06-18 PROCEDURE — 93010 ELECTROCARDIOGRAM REPORT: CPT

## 2025-06-18 PROCEDURE — 3075F SYST BP GE 130 - 139MM HG: CPT

## 2025-06-18 PROCEDURE — 1126F AMNT PAIN NOTED NONE PRSNT: CPT

## 2025-06-18 PROCEDURE — 1159F MED LIST DOCD IN RCRD: CPT

## 2025-06-18 PROCEDURE — 1123F ACP DISCUSS/DSCN MKR DOCD: CPT

## 2025-06-18 RX ORDER — SPIRONOLACTONE AND HYDROCHLOROTHIAZIDE 25; 25 MG/1; MG/1
0.5 TABLET ORAL DAILY
COMMUNITY

## 2025-06-18 ASSESSMENT — PATIENT HEALTH QUESTIONNAIRE - PHQ9
2. FEELING DOWN, DEPRESSED OR HOPELESS: NOT AT ALL
SUM OF ALL RESPONSES TO PHQ QUESTIONS 1-9: 0
1. LITTLE INTEREST OR PLEASURE IN DOING THINGS: NOT AT ALL

## 2025-06-18 NOTE — PROGRESS NOTES
1. Have you been to the ER, urgent care clinic since your last visit?  Hospitalized since your last visit?  Unsure      2. Have you seen or consulted any other health care providers outside of the [facility] since your last visit?  Include any pap smears or colon screening.   PCP Dr. Maurilio Munguia   
by mouth daily    aspirin 81 MG EC tablet Take 1 tablet by mouth daily    atorvastatin (LIPITOR) 40 MG tablet Take 1 tablet by mouth nightly    hydrALAZINE (APRESOLINE) 50 MG tablet Take 1.5 tablets by mouth 3 times daily    metFORMIN (GLUCOPHAGE) 500 MG tablet Take 1 tablet by mouth 2 times daily (with meals)     No current facility-administered medications for this visit.      Diagnostic Data Review:     No results found for this or any previous visit.        No results found for this or any previous visit.         No results found for this or any previous visit.       Lab Review:   No results found for: \"CHOL\", \"CHLST\", \"CHOLV\", \"302970\", \"HDL\", \"HDLC\", \"LDL\", \"LDLC\"  No results found for: \"TRACY\", \"CREAPOC\"  Lab Results   Component Value Date/Time    BUN 10 08/16/2021 11:29 AM     Lab Results   Component Value Date/Time    K 4.2 08/16/2021 11:29 AM     No results found for: \"HBA1C\"  No results found for: \"HGBPOC\", \"HGB\", \"HGBP\"  No results found for: \"PLT\"               ___________________________________________________    Leslie Hearn NP    Cardiac Electrophysiology  [facility]   [address] [facility]  [place] [address] [phone]

## 2025-06-22 PROCEDURE — 93294 REM INTERROG EVL PM/LDLS PM: CPT | Performed by: INTERNAL MEDICINE

## 2025-07-09 ENCOUNTER — TELEPHONE (OUTPATIENT)
Age: 75
End: 2025-07-09

## 2025-07-09 NOTE — TELEPHONE ENCOUNTER
Returned pts call. ID verified using two patient identifiers. Pt states he will be going out of the country until next year and was wondering if he should take his home monitor. Pt encouraged to call [employer], number provided, to ask about connectivity where he will be going.     Opportunities for questions, clarifications, and concerns provided.  Pt expressed understanding.

## 2025-07-09 NOTE — TELEPHONE ENCOUNTER
Pt calling because he is going out of the country and was wondering if he would need to take his home monitor with him. Would like a call back to discuss further.    Pt ph# [phone]

## 2025-08-28 ENCOUNTER — TELEPHONE (OUTPATIENT)
Age: 75
End: 2025-08-28

## 2025-08-29 ENCOUNTER — CLINICAL DOCUMENTATION (OUTPATIENT)
Age: 75
End: 2025-08-29

## (undated) DEVICE — SUTURE STRATAFIX SPRL PDS + SZ 2-0 L9IN ABSRB VLT CT-1 SXPP1B456

## (undated) DEVICE — LIMB HOLDER, WRIST/ANKLE: Brand: DEROYAL

## (undated) DEVICE — CATH GUID COR JL4.0 6FR 100CM -- LAUNCHER

## (undated) DEVICE — DRESSING POSTOP AG PRISMASEAL 3.5X6IN

## (undated) DEVICE — SYRINGE IRRIG 60ML SFT PLIABLE BLB EZ TO GRP 1 HND USE W/

## (undated) DEVICE — SUTURE ETHBND EXCEL SZ 2 L30IN NONABSORBABLE GRN L40MM V-37 MX69G

## (undated) DEVICE — REM POLYHESIVE ADULT PATIENT RETURN ELECTRODE: Brand: VALLEYLAB

## (undated) DEVICE — PROCEDURE KIT FLUID MGMT CUST MAINFOLD STRL

## (undated) DEVICE — PACK PROCEDURE SURG HRT CATH

## (undated) DEVICE — PRESSURE MONITORING SET: Brand: TRUWAVE

## (undated) DEVICE — NEEDLE ANGIO 18GA L9CM NRML 1 WALL SMOOTH FINISH CLR HUB FOR

## (undated) DEVICE — SUTURE STRATAFIX SPRL MCRYL + SZ 3-0 L24IN ABSRB UD PS-2 SXMP1B108

## (undated) DEVICE — DEVICE INFL 20ML 30ATM DGT FLD DISPNS SYR W ACCESSPLUS BLU

## (undated) DEVICE — 3M™ IOBAN™ 2 ANTIMICROBIAL INCISE DRAPE 6640EZ: Brand: IOBAN™ 2

## (undated) DEVICE — SLING ORTHOPEDIC PCH UNIV 19.5X9 IN 2-39 IN ARM W/ FOAM STRP

## (undated) DEVICE — TUBING PRSS MON L6IN PVC M FEM CONN

## (undated) DEVICE — ANGIOGRAPHIC CATHETER: Brand: IMPULSE™

## (undated) DEVICE — ZIP 8I SURGICAL SKIN CLOSURE DEVICE: Brand: ZIP 8I SURGICAL SKIN CLOSURE DEVICE

## (undated) DEVICE — Device: Brand: OMNIWIRE PRESSURE GUIDE WIRE

## (undated) DEVICE — INTRO PEELWY HEMVLV 7F 13CM -- SHRT PRELUDE SNAP

## (undated) DEVICE — PINNACLE INTRODUCER SHEATH: Brand: PINNACLE

## (undated) DEVICE — MEDI-TRACE CADENCE ADULT, DEFIBRILLATION ELECTRODE -RTS  (10 PR/PK) - PHYSIO-CONTROL: Brand: MEDI-TRACE CADENCE